# Patient Record
Sex: MALE | Race: WHITE | NOT HISPANIC OR LATINO | Employment: FULL TIME | URBAN - METROPOLITAN AREA
[De-identification: names, ages, dates, MRNs, and addresses within clinical notes are randomized per-mention and may not be internally consistent; named-entity substitution may affect disease eponyms.]

---

## 2019-01-14 ENCOUNTER — HOSPITAL ENCOUNTER (OUTPATIENT)
Dept: OTHER | Facility: HOSPITAL | Age: 29
Setting detail: SPECIMEN
Discharge: HOME | End: 2019-01-14
Attending: FAMILY MEDICINE | Admitting: FAMILY MEDICINE

## 2019-01-14 LAB
ALBUMIN SERPL-MCNC: 4.7 G/DL (ref 3.5–4.8)
ALBUMIN/GLOB SERPL: 2 {RATIO} (ref 1–1.7)
ALP SERPL-CCNC: 46 IU/L (ref 32–91)
ALT SERPL-CCNC: 30 IU/L (ref 17–63)
ANION GAP SERPL CALC-SCNC: 14.7 MMOL/L (ref 10–20)
AST SERPL-CCNC: 27 IU/L (ref 15–41)
BASOPHILS # BLD AUTO: 0 10*3/UL (ref 0–0.2)
BASOPHILS NFR BLD AUTO: 0 % (ref 0–2)
BILIRUB SERPL-MCNC: 0.7 MG/DL (ref 0.3–1.2)
BUN SERPL-MCNC: 17 MG/DL (ref 8–20)
BUN/CREAT SERPL: 18.9 (ref 6.2–20.3)
CALCIUM SERPL-MCNC: 9.2 MG/DL (ref 8.9–10.3)
CHLORIDE SERPL-SCNC: 104 MMOL/L (ref 101–111)
CHOLEST SERPL-MCNC: 188 MG/DL
CHOLEST/HDLC SERPL: 3.3 {RATIO}
CONV CO2: 25 MMOL/L (ref 22–32)
CONV LDL CHOLESTEROL DIRECT: 139 MG/DL (ref 0–100)
CONV TOTAL PROTEIN: 7.1 G/DL (ref 6.1–7.9)
CREAT UR-MCNC: 0.9 MG/DL (ref 0.7–1.2)
DIFFERENTIAL METHOD BLD: (no result)
EOSINOPHIL # BLD AUTO: 0.1 10*3/UL (ref 0–0.3)
EOSINOPHIL # BLD AUTO: 1 % (ref 0–3)
ERYTHROCYTE [DISTWIDTH] IN BLOOD BY AUTOMATED COUNT: 12.8 % (ref 11.5–14.5)
GLOBULIN UR ELPH-MCNC: 2.4 G/DL (ref 2.5–3.8)
GLUCOSE SERPL-MCNC: 87 MG/DL (ref 65–99)
HCT VFR BLD AUTO: 45.8 % (ref 40–54)
HDLC SERPL-MCNC: 57 MG/DL
HGB BLD-MCNC: 15.6 G/DL (ref 14–18)
LDLC/HDLC SERPL: 2.4 {RATIO}
LIPID INTERPRETATION: ABNORMAL
LYMPHOCYTES # BLD AUTO: 2 10*3/UL (ref 0.8–4.8)
LYMPHOCYTES NFR BLD AUTO: 40 % (ref 18–42)
MCH RBC QN AUTO: 29.2 PG (ref 26–32)
MCHC RBC AUTO-ENTMCNC: 34.1 G/DL (ref 32–36)
MCV RBC AUTO: 85.5 FL (ref 80–94)
MONOCYTES # BLD AUTO: 0.4 10*3/UL (ref 0.1–1.3)
MONOCYTES NFR BLD AUTO: 8 % (ref 2–11)
NEUTROPHILS # BLD AUTO: 2.5 10*3/UL (ref 2.3–8.6)
NEUTROPHILS NFR BLD AUTO: 51 % (ref 50–75)
NRBC BLD AUTO-RTO: 0 /100{WBCS}
NRBC/RBC NFR BLD MANUAL: 0 10*3/UL
PLATELET # BLD AUTO: 208 10*3/UL (ref 150–450)
PMV BLD AUTO: 9.1 FL (ref 7.4–10.4)
POTASSIUM SERPL-SCNC: 4.7 MMOL/L (ref 3.6–5.1)
RBC # BLD AUTO: 5.35 10*6/UL (ref 4.6–6)
SODIUM SERPL-SCNC: 139 MMOL/L (ref 136–144)
TRIGL SERPL-MCNC: 43 MG/DL
WBC # BLD AUTO: 4.9 10*3/UL (ref 4.5–11.5)

## 2019-09-12 RX ORDER — BISOPROLOL FUMARATE 5 MG/1
5 TABLET, FILM COATED ORAL DAILY
Qty: 30 TABLET | Refills: 0 | Status: SHIPPED | OUTPATIENT
Start: 2019-09-12 | End: 2019-10-10 | Stop reason: SDUPTHER

## 2019-09-12 RX ORDER — BISOPROLOL FUMARATE 5 MG/1
TABLET, FILM COATED ORAL EVERY 24 HOURS
COMMUNITY
Start: 2018-09-18 | End: 2019-09-12 | Stop reason: SDUPTHER

## 2019-10-10 RX ORDER — BISOPROLOL FUMARATE 5 MG/1
5 TABLET, FILM COATED ORAL DAILY
Qty: 30 TABLET | Refills: 0 | Status: SHIPPED | OUTPATIENT
Start: 2019-10-10 | End: 2019-11-13 | Stop reason: SDUPTHER

## 2019-11-13 NOTE — TELEPHONE ENCOUNTER
No show for appt with Taylor on 10/16/19.  Last ov and labs was with Dr. Hays on 1/14/19.  No future ov with a PCP

## 2019-11-15 RX ORDER — BISOPROLOL FUMARATE 5 MG/1
5 TABLET, FILM COATED ORAL DAILY
Qty: 30 TABLET | Refills: 0 | Status: SHIPPED | OUTPATIENT
Start: 2019-11-15 | End: 2019-12-26

## 2019-11-19 NOTE — TELEPHONE ENCOUNTER
Patient did not answer phone and does not have  set up.  I prepared a letter to be sent with information.

## 2019-12-26 RX ORDER — BISOPROLOL FUMARATE 5 MG/1
5 TABLET, FILM COATED ORAL DAILY
Qty: 30 TABLET | Refills: 0 | Status: SHIPPED | OUTPATIENT
Start: 2019-12-26 | End: 2020-02-05

## 2020-02-05 RX ORDER — BISOPROLOL FUMARATE 5 MG/1
5 TABLET, FILM COATED ORAL DAILY
Qty: 30 TABLET | Refills: 0 | Status: SHIPPED | OUTPATIENT
Start: 2020-02-05 | End: 2020-03-09

## 2020-03-09 RX ORDER — BISOPROLOL FUMARATE 5 MG/1
5 TABLET, FILM COATED ORAL DAILY
Qty: 30 TABLET | Refills: 0 | Status: SHIPPED | OUTPATIENT
Start: 2020-03-09 | End: 2020-04-14 | Stop reason: SDUPTHER

## 2020-04-14 ENCOUNTER — TELEMEDICINE (OUTPATIENT)
Dept: FAMILY MEDICINE CLINIC | Facility: CLINIC | Age: 30
End: 2020-04-14

## 2020-04-14 VITALS — WEIGHT: 165 LBS | BODY MASS INDEX: 24.44 KG/M2 | HEIGHT: 69 IN

## 2020-04-14 DIAGNOSIS — I10 ESSENTIAL HYPERTENSION: Primary | ICD-10-CM

## 2020-04-14 PROCEDURE — 99213 OFFICE O/P EST LOW 20 MIN: CPT | Performed by: NURSE PRACTITIONER

## 2020-04-14 RX ORDER — BISOPROLOL FUMARATE 5 MG/1
5 TABLET, FILM COATED ORAL DAILY
Qty: 30 TABLET | Refills: 2 | Status: SHIPPED | OUTPATIENT
Start: 2020-04-14 | End: 2020-04-23

## 2020-04-14 NOTE — PROGRESS NOTES
Chief Complaint   Patient presents with   • Establish Care     You have chosen to receive care through a telephone visit. Do you consent to use a telephone visit for your medical care today? Yes      HPI     Patient presents with via video visit to establish care and to follow-up on HTN, needs medication refill.       HTN, stable on meds and takes as directed, denies chest pain, headache, shortness of air, palpitations and swelling of extremities. Last labs stable Jan 2019.       Past Medical History:   Diagnosis Date   • Allergic rhinitis    • Conjunctivitis, acute    • HBP (high blood pressure)    • Hypertension    • Rhus dermatitis      No past surgical history on file.  Family History   Problem Relation Age of Onset   • Hypertension Father    • Hypertension Brother    • Cancer Other      Social History     Tobacco Use   • Smoking status: Former Smoker   Substance Use Topics   • Alcohol use: Not on file         Current Outpatient Medications:   •  bisoprolol (ZEBeta) 5 MG tablet, Take 1 tablet by mouth Daily., Disp: 30 tablet, Rfl: 2        The following portions of the patient's history were reviewed and updated as appropriate: allergies, current medications, past family history, past medical history, past social history, past surgical history and problem list.    Review of Systems   Constitutional: Negative for chills, fatigue and fever.   HENT: Negative for dental problem, ear pain, sinus pressure and sore throat.    Eyes: Negative for visual disturbance.   Respiratory: Negative for cough, shortness of breath and wheezing.    Cardiovascular: Negative for chest pain, palpitations and leg swelling.   Gastrointestinal: Negative for abdominal pain, blood in stool, constipation, diarrhea, nausea, vomiting and GERD.   Genitourinary: Negative for difficulty urinating, frequency, urgency and urinary incontinence.   Musculoskeletal: Negative for arthralgias, back pain, gait problem, joint swelling, myalgias and neck  "pain.   Skin: Negative for dry skin, pallor and rash.   Neurological: Negative for dizziness, seizures, speech difficulty and weakness.   Hematological: Negative for adenopathy.   Psychiatric/Behavioral: Negative for sleep disturbance, depressed mood and stress. The patient is not nervous/anxious.         Vitals:    04/14/20 0959   Weight: 74.8 kg (165 lb)   Height: 175.3 cm (69\")     Body mass index is 24.37 kg/m².  Physical Exam   Constitutional: He is oriented to person, place, and time. No distress.   Exam otherwise deferred through video visit   Pulmonary/Chest: Effort normal.   Neurological: He is alert and oriented to person, place, and time.   Psychiatric: He has a normal mood and affect. His behavior is normal. Judgment and thought content normal.     No visits with results within 7 Day(s) from this visit.   Latest known visit with results is:   No results found for any previous visit.       Diagnoses and all orders for this visit:    1. Essential hypertension (Primary)  -     Lipid Panel; Future  -     Comprehensive Metabolic Panel; Future  -     CBC (No Diff); Future  -     TSH; Future    Other orders  -     bisoprolol (ZEBeta) 5 MG tablet; Take 1 tablet by mouth Daily.  Dispense: 30 tablet; Refill: 2    -BP reportedly stable, refill bisoprolol, continue daily  -Return to office in 3 months with fasting labs 1 week prior.  We will discuss at follow-up visit   -call or return to office earlier if needed    This was an audio and video enabled telemedicine encounter.      "

## 2020-04-23 RX ORDER — BISOPROLOL FUMARATE 5 MG/1
5 TABLET, FILM COATED ORAL DAILY
Qty: 30 TABLET | Refills: 2 | Status: SHIPPED | OUTPATIENT
Start: 2020-04-23 | End: 2020-07-14 | Stop reason: SDUPTHER

## 2020-07-06 ENCOUNTER — CLINICAL SUPPORT (OUTPATIENT)
Dept: FAMILY MEDICINE CLINIC | Facility: CLINIC | Age: 30
End: 2020-07-06

## 2020-07-06 DIAGNOSIS — I10 ESSENTIAL HYPERTENSION: ICD-10-CM

## 2020-07-06 PROCEDURE — 80061 LIPID PANEL: CPT | Performed by: NURSE PRACTITIONER

## 2020-07-06 PROCEDURE — 85027 COMPLETE CBC AUTOMATED: CPT | Performed by: NURSE PRACTITIONER

## 2020-07-06 PROCEDURE — 84443 ASSAY THYROID STIM HORMONE: CPT | Performed by: NURSE PRACTITIONER

## 2020-07-06 PROCEDURE — 80053 COMPREHEN METABOLIC PANEL: CPT | Performed by: NURSE PRACTITIONER

## 2020-07-07 LAB
ALBUMIN SERPL-MCNC: 5 G/DL (ref 3.5–5.2)
ALBUMIN/GLOB SERPL: 2.3 G/DL
ALP SERPL-CCNC: 45 U/L (ref 39–117)
ALT SERPL W P-5'-P-CCNC: 25 U/L (ref 1–41)
ANION GAP SERPL CALCULATED.3IONS-SCNC: 11.4 MMOL/L (ref 5–15)
AST SERPL-CCNC: 23 U/L (ref 1–40)
BILIRUB SERPL-MCNC: 0.6 MG/DL (ref 0–1.2)
BUN SERPL-MCNC: 20 MG/DL (ref 6–20)
BUN/CREAT SERPL: 22 (ref 7–25)
CALCIUM SPEC-SCNC: 9.5 MG/DL (ref 8.6–10.5)
CHLORIDE SERPL-SCNC: 102 MMOL/L (ref 98–107)
CHOLEST SERPL-MCNC: 208 MG/DL (ref 0–200)
CO2 SERPL-SCNC: 25.6 MMOL/L (ref 22–29)
CREAT SERPL-MCNC: 0.91 MG/DL (ref 0.76–1.27)
DEPRECATED RDW RBC AUTO: 41.3 FL (ref 37–54)
ERYTHROCYTE [DISTWIDTH] IN BLOOD BY AUTOMATED COUNT: 12.8 % (ref 12.3–15.4)
GFR SERPL CREATININE-BSD FRML MDRD: 97 ML/MIN/1.73
GLOBULIN UR ELPH-MCNC: 2.2 GM/DL
GLUCOSE SERPL-MCNC: 90 MG/DL (ref 65–99)
HCT VFR BLD AUTO: 45.6 % (ref 37.5–51)
HDLC SERPL-MCNC: 54 MG/DL (ref 40–60)
HGB BLD-MCNC: 15.4 G/DL (ref 13–17.7)
LDLC SERPL CALC-MCNC: 138 MG/DL (ref 0–100)
LDLC/HDLC SERPL: 2.56 {RATIO}
MCH RBC QN AUTO: 29.5 PG (ref 26.6–33)
MCHC RBC AUTO-ENTMCNC: 33.8 G/DL (ref 31.5–35.7)
MCV RBC AUTO: 87.4 FL (ref 79–97)
PLATELET # BLD AUTO: 223 10*3/MM3 (ref 140–450)
PMV BLD AUTO: 11.6 FL (ref 6–12)
POTASSIUM SERPL-SCNC: 4.1 MMOL/L (ref 3.5–5.2)
PROT SERPL-MCNC: 7.2 G/DL (ref 6–8.5)
RBC # BLD AUTO: 5.22 10*6/MM3 (ref 4.14–5.8)
SODIUM SERPL-SCNC: 139 MMOL/L (ref 136–145)
TRIGL SERPL-MCNC: 80 MG/DL (ref 0–150)
TSH SERPL DL<=0.05 MIU/L-ACNC: 1.28 UIU/ML (ref 0.27–4.2)
VLDLC SERPL-MCNC: 16 MG/DL (ref 5–40)
WBC # BLD AUTO: 5.11 10*3/MM3 (ref 3.4–10.8)

## 2020-07-14 ENCOUNTER — OFFICE VISIT (OUTPATIENT)
Dept: FAMILY MEDICINE CLINIC | Facility: CLINIC | Age: 30
End: 2020-07-14

## 2020-07-14 VITALS
WEIGHT: 175.4 LBS | BODY MASS INDEX: 25.98 KG/M2 | SYSTOLIC BLOOD PRESSURE: 129 MMHG | TEMPERATURE: 97.5 F | OXYGEN SATURATION: 97 % | HEART RATE: 78 BPM | DIASTOLIC BLOOD PRESSURE: 78 MMHG | HEIGHT: 69 IN

## 2020-07-14 DIAGNOSIS — F41.9 ANXIETY: ICD-10-CM

## 2020-07-14 DIAGNOSIS — E78.2 MIXED HYPERLIPIDEMIA: ICD-10-CM

## 2020-07-14 DIAGNOSIS — I10 ESSENTIAL HYPERTENSION: Primary | ICD-10-CM

## 2020-07-14 DIAGNOSIS — G47.09 OTHER INSOMNIA: ICD-10-CM

## 2020-07-14 PROCEDURE — 99214 OFFICE O/P EST MOD 30 MIN: CPT | Performed by: NURSE PRACTITIONER

## 2020-07-14 RX ORDER — BISOPROLOL FUMARATE 5 MG/1
5 TABLET, FILM COATED ORAL DAILY
Qty: 30 TABLET | Refills: 5 | Status: SHIPPED | OUTPATIENT
Start: 2020-07-14 | End: 2020-11-09

## 2020-07-14 RX ORDER — HYDROXYZINE HYDROCHLORIDE 10 MG/1
10 TABLET, FILM COATED ORAL EVERY 8 HOURS PRN
Qty: 30 TABLET | Refills: 0 | Status: SHIPPED | OUTPATIENT
Start: 2020-07-14 | End: 2020-10-01 | Stop reason: SDUPTHER

## 2020-07-14 NOTE — PROGRESS NOTES
"Chief Complaint   Patient presents with   • Hypertension   • Follow-up   • Results     blood test results       HPI     HTN, stable on meds and takes as directed, denies chest pain, headache, shortness of air, palpitations and swelling of extremities.     Hyperlipidemia, The patient denies muscle aches, constipation, diarrhea, GI upset, fatigue, chest pain/pressure, exercise intolerance, dyspnea, palpitations, syncope and pedal edema.      Anxiety, patient denies significant weight loss/gain, +insomnia, denies hypersomnia, psychomotor agitation, psychomotor retardation, fatigue (loss of energy), feelings of worthlessness (guilt), impaired concentration (indecisiveness), thoughts of death or suicide.       The following portions of the patient's history were reviewed and updated as appropriate: allergies, current medications, past family history, past medical history, past social history, past surgical history and problem list.    Past Medical History:   Diagnosis Date   • Allergic rhinitis    • Conjunctivitis, acute    • HBP (high blood pressure)    • Hypertension    • Rhus dermatitis      No past surgical history on file.  Family History   Problem Relation Age of Onset   • Hypertension Father    • Hypertension Brother    • Cancer Other      Social History     Tobacco Use   • Smoking status: Former Smoker   Substance Use Topics   • Alcohol use: Not on file         Current Outpatient Medications:   •  bisoprolol (ZEBeta) 5 MG tablet, TAKE 1 TABLET BY MOUTH DAILY, Disp: 30 tablet, Rfl: 2      Review of Systems       A full 12 point review of systems has been obtained as mentioned in HPI, otherwise negative      Vitals:    07/14/20 1530   BP: 129/78   BP Location: Right arm   Patient Position: Sitting   Cuff Size: Adult   Pulse: 78   Temp: 97.5 °F (36.4 °C)   TempSrc: Skin   SpO2: 97%   Weight: 79.6 kg (175 lb 6.4 oz)   Height: 175.3 cm (69.02\")     Body mass index is 25.89 kg/m².    Physical Exam   Constitutional: He " is oriented to person, place, and time. He appears well-developed and well-nourished. No distress.   HENT:   Head: Normocephalic and atraumatic.   Eyes: Pupils are equal, round, and reactive to light.   Neck: Normal range of motion. No thyromegaly present.   Cardiovascular: Normal rate, regular rhythm, normal heart sounds and intact distal pulses.   No murmur heard.  Pulmonary/Chest: Effort normal and breath sounds normal. No respiratory distress.   Abdominal: Soft. Bowel sounds are normal. He exhibits no distension. There is no tenderness.   Musculoskeletal: Normal range of motion.   Neurological: He is alert and oriented to person, place, and time.   Skin: Skin is warm and dry. He is not diaphoretic. No erythema.   Psychiatric: He has a normal mood and affect. His behavior is normal. Judgment and thought content normal.   Nursing note and vitals reviewed.      No visits with results within 7 Day(s) from this visit.   Latest known visit with results is:   Clinical Support on 07/06/2020   Component Date Value Ref Range Status   • TSH 07/06/2020 1.280  0.270 - 4.200 uIU/mL Final   • WBC 07/06/2020 5.11  3.40 - 10.80 10*3/mm3 Final   • RBC 07/06/2020 5.22  4.14 - 5.80 10*6/mm3 Final   • Hemoglobin 07/06/2020 15.4  13.0 - 17.7 g/dL Final   • Hematocrit 07/06/2020 45.6  37.5 - 51.0 % Final   • MCV 07/06/2020 87.4  79.0 - 97.0 fL Final   • MCH 07/06/2020 29.5  26.6 - 33.0 pg Final   • MCHC 07/06/2020 33.8  31.5 - 35.7 g/dL Final   • RDW 07/06/2020 12.8  12.3 - 15.4 % Final   • RDW-SD 07/06/2020 41.3  37.0 - 54.0 fl Final   • MPV 07/06/2020 11.6  6.0 - 12.0 fL Final   • Platelets 07/06/2020 223  140 - 450 10*3/mm3 Final   • Glucose 07/06/2020 90  65 - 99 mg/dL Final   • BUN 07/06/2020 20  6 - 20 mg/dL Final   • Creatinine 07/06/2020 0.91  0.76 - 1.27 mg/dL Final   • Sodium 07/06/2020 139  136 - 145 mmol/L Final   • Potassium 07/06/2020 4.1  3.5 - 5.2 mmol/L Final   • Chloride 07/06/2020 102  98 - 107 mmol/L Final   • CO2  07/06/2020 25.6  22.0 - 29.0 mmol/L Final   • Calcium 07/06/2020 9.5  8.6 - 10.5 mg/dL Final   • Total Protein 07/06/2020 7.2  6.0 - 8.5 g/dL Final   • Albumin 07/06/2020 5.00  3.50 - 5.20 g/dL Final   • ALT (SGPT) 07/06/2020 25  1 - 41 U/L Final   • AST (SGOT) 07/06/2020 23  1 - 40 U/L Final   • Alkaline Phosphatase 07/06/2020 45  39 - 117 U/L Final   • Total Bilirubin 07/06/2020 0.6  0.0 - 1.2 mg/dL Final   • eGFR Non African Amer 07/06/2020 97  >60 mL/min/1.73 Final   • Globulin 07/06/2020 2.2  gm/dL Final   • A/G Ratio 07/06/2020 2.3  g/dL Final   • BUN/Creatinine Ratio 07/06/2020 22.0  7.0 - 25.0 Final   • Anion Gap 07/06/2020 11.4  5.0 - 15.0 mmol/L Final   • Total Cholesterol 07/06/2020 208* 0 - 200 mg/dL Final   • Triglycerides 07/06/2020 80  0 - 150 mg/dL Final   • HDL Cholesterol 07/06/2020 54  40 - 60 mg/dL Final   • LDL Cholesterol  07/06/2020 138* 0 - 100 mg/dL Final   • VLDL Cholesterol 07/06/2020 16  5 - 40 mg/dL Final   • LDL/HDL Ratio 07/06/2020 2.56   Final       Diagnoses and all orders for this visit:    1. Essential hypertension (Primary)  bp stable, rf bisoprolol    2. Mixed hyperlipidemia  Reviewed labs, discussed healthy heart diet, limiting fatty, fried, greasy foods and increasing exercise habits    3. Anxiety  Trial hydroxyzine prn, consideer valerium root    4. Other insomnia  Likely, anxiety driven.     rtc 6mo or earlier prn

## 2020-10-01 ENCOUNTER — OFFICE VISIT (OUTPATIENT)
Dept: FAMILY MEDICINE CLINIC | Facility: CLINIC | Age: 30
End: 2020-10-01

## 2020-10-01 VITALS
HEIGHT: 69 IN | SYSTOLIC BLOOD PRESSURE: 139 MMHG | DIASTOLIC BLOOD PRESSURE: 101 MMHG | TEMPERATURE: 97.1 F | WEIGHT: 183.8 LBS | OXYGEN SATURATION: 98 % | BODY MASS INDEX: 27.22 KG/M2 | HEART RATE: 67 BPM

## 2020-10-01 DIAGNOSIS — F41.9 ANXIETY: Primary | ICD-10-CM

## 2020-10-01 DIAGNOSIS — Z23 INFLUENZA VACCINE ADMINISTERED: ICD-10-CM

## 2020-10-01 PROCEDURE — 90686 IIV4 VACC NO PRSV 0.5 ML IM: CPT | Performed by: NURSE PRACTITIONER

## 2020-10-01 PROCEDURE — 90471 IMMUNIZATION ADMIN: CPT | Performed by: NURSE PRACTITIONER

## 2020-10-01 PROCEDURE — 99213 OFFICE O/P EST LOW 20 MIN: CPT | Performed by: NURSE PRACTITIONER

## 2020-10-01 RX ORDER — HYDROXYZINE HYDROCHLORIDE 10 MG/1
TABLET, FILM COATED ORAL
Qty: 60 TABLET | Refills: 0 | Status: SHIPPED | OUTPATIENT
Start: 2020-10-01 | End: 2021-04-05

## 2020-10-01 NOTE — PROGRESS NOTES
Chief Complaint   Patient presents with   • Anxiety     wants to discuss medication.  pt reports that some days hydroxyzine helped, but now it doesn't always helps.  wants to discuss other options.   • Follow-up     would like flu vax and says it has probably been 10 yrs since last tdap       HPI     Anxiety, chronic, intermittent, happens more socially-crowded areas/restaurants, isolating and would rather not go out, is more introverted now, hydrox helps some but , patient denies significant weight loss/gain, insomnia, hypersomnia, psychomotor agitation, psychomotor retardation, fatigue (loss of energy), feelings of worthlessness (guilt), impaired concentration (indecisiveness), thoughts of death or suicide.         The following portions of the patient's history were reviewed and updated as appropriate: allergies, current medications, past family history, past medical history, past social history, past surgical history and problem list.    Past Medical History:   Diagnosis Date   • Allergic rhinitis    • Conjunctivitis, acute    • HBP (high blood pressure)    • Hypertension    • Rhus dermatitis      History reviewed. No pertinent surgical history.  Family History   Problem Relation Age of Onset   • Hypertension Father    • Hypertension Brother    • Cancer Other      Social History     Tobacco Use   • Smoking status: Former Smoker   Substance Use Topics   • Alcohol use: Not on file         Current Outpatient Medications:   •  bisoprolol (ZEBeta) 5 MG tablet, Take 1 tablet by mouth Daily., Disp: 30 tablet, Rfl: 5  •  hydrOXYzine (ATARAX) 10 MG tablet, Take 1 or 2 po every 8 hours prn for anxiety, Disp: 60 tablet, Rfl: 0      Review of Systems       Obtained as mentioned in HPI, otherwise negative.       Vitals:    10/01/20 1611   BP: (!) 139/101   BP Location: Left arm   Patient Position: Sitting   Cuff Size: Adult   Pulse: 67   Temp: 97.1 °F (36.2 °C)   TempSrc: Skin   SpO2: 98%   Weight: 83.4 kg (183 lb 12.8 oz)  "  Height: 175.3 cm (69.02\")     Body mass index is 27.13 kg/m².    Physical Exam  Constitutional:       General: He is not in acute distress.     Appearance: He is well-developed. He is not diaphoretic.   HENT:      Head: Normocephalic.   Eyes:      Conjunctiva/sclera: Conjunctivae normal.      Pupils: Pupils are equal, round, and reactive to light.   Neck:      Musculoskeletal: Normal range of motion and neck supple.      Thyroid: No thyromegaly.      Vascular: No JVD.   Cardiovascular:      Rate and Rhythm: Normal rate and regular rhythm.      Heart sounds: Normal heart sounds. No murmur.   Pulmonary:      Effort: Pulmonary effort is normal.      Breath sounds: Normal breath sounds.   Abdominal:      General: Bowel sounds are normal. There is no distension.      Palpations: Abdomen is soft.      Tenderness: There is no abdominal tenderness.   Musculoskeletal: Normal range of motion.         General: No tenderness.   Skin:     General: Skin is warm and dry.      Findings: No erythema or rash.   Neurological:      Mental Status: He is alert and oriented to person, place, and time.      Sensory: No sensory deficit.   Psychiatric:         Attention and Perception: Attention normal.         Mood and Affect: Mood is anxious. Mood is not depressed.         Speech: Speech normal.         Behavior: Behavior normal.         Thought Content: Thought content normal.         Cognition and Memory: Cognition normal.         Judgment: Judgment normal.         No visits with results within 7 Day(s) from this visit.   Latest known visit with results is:   Clinical Support on 07/06/2020   Component Date Value Ref Range Status   • TSH 07/06/2020 1.280  0.270 - 4.200 uIU/mL Final   • WBC 07/06/2020 5.11  3.40 - 10.80 10*3/mm3 Final   • RBC 07/06/2020 5.22  4.14 - 5.80 10*6/mm3 Final   • Hemoglobin 07/06/2020 15.4  13.0 - 17.7 g/dL Final   • Hematocrit 07/06/2020 45.6  37.5 - 51.0 % Final   • MCV 07/06/2020 87.4  79.0 - 97.0 fL Final "   • MCH 07/06/2020 29.5  26.6 - 33.0 pg Final   • MCHC 07/06/2020 33.8  31.5 - 35.7 g/dL Final   • RDW 07/06/2020 12.8  12.3 - 15.4 % Final   • RDW-SD 07/06/2020 41.3  37.0 - 54.0 fl Final   • MPV 07/06/2020 11.6  6.0 - 12.0 fL Final   • Platelets 07/06/2020 223  140 - 450 10*3/mm3 Final   • Glucose 07/06/2020 90  65 - 99 mg/dL Final   • BUN 07/06/2020 20  6 - 20 mg/dL Final   • Creatinine 07/06/2020 0.91  0.76 - 1.27 mg/dL Final   • Sodium 07/06/2020 139  136 - 145 mmol/L Final   • Potassium 07/06/2020 4.1  3.5 - 5.2 mmol/L Final   • Chloride 07/06/2020 102  98 - 107 mmol/L Final   • CO2 07/06/2020 25.6  22.0 - 29.0 mmol/L Final   • Calcium 07/06/2020 9.5  8.6 - 10.5 mg/dL Final   • Total Protein 07/06/2020 7.2  6.0 - 8.5 g/dL Final   • Albumin 07/06/2020 5.00  3.50 - 5.20 g/dL Final   • ALT (SGPT) 07/06/2020 25  1 - 41 U/L Final   • AST (SGOT) 07/06/2020 23  1 - 40 U/L Final   • Alkaline Phosphatase 07/06/2020 45  39 - 117 U/L Final   • Total Bilirubin 07/06/2020 0.6  0.0 - 1.2 mg/dL Final   • eGFR Non African Amer 07/06/2020 97  >60 mL/min/1.73 Final   • Globulin 07/06/2020 2.2  gm/dL Final   • A/G Ratio 07/06/2020 2.3  g/dL Final   • BUN/Creatinine Ratio 07/06/2020 22.0  7.0 - 25.0 Final   • Anion Gap 07/06/2020 11.4  5.0 - 15.0 mmol/L Final   • Total Cholesterol 07/06/2020 208* 0 - 200 mg/dL Final   • Triglycerides 07/06/2020 80  0 - 150 mg/dL Final   • HDL Cholesterol 07/06/2020 54  40 - 60 mg/dL Final   • LDL Cholesterol  07/06/2020 138* 0 - 100 mg/dL Final   • VLDL Cholesterol 07/06/2020 16  5 - 40 mg/dL Final   • LDL/HDL Ratio 07/06/2020 2.56   Final       Diagnoses and all orders for this visit:    1. Anxiety (Primary)  Comments:  rec inc hydroxyzine to 2 tabs prn prior to events, social situations.     2. Influenza vaccine administered    Other orders  -     hydrOXYzine (ATARAX) 10 MG tablet; Take 1 or 2 po every 8 hours prn for anxiety  Dispense: 60 tablet; Refill: 0  -     Fluarix/Fluzone/Afluria Quad>6  Months      Return in about 6 months (around 4/1/2021) for anxiety.

## 2020-10-23 ENCOUNTER — CLINICAL SUPPORT (OUTPATIENT)
Dept: FAMILY MEDICINE CLINIC | Facility: CLINIC | Age: 30
End: 2020-10-23

## 2020-10-23 ENCOUNTER — TELEPHONE (OUTPATIENT)
Dept: FAMILY MEDICINE CLINIC | Facility: CLINIC | Age: 30
End: 2020-10-23

## 2020-10-23 DIAGNOSIS — Z23 NEED FOR TDAP VACCINATION: Primary | ICD-10-CM

## 2020-10-23 PROCEDURE — 90471 IMMUNIZATION ADMIN: CPT | Performed by: NURSE PRACTITIONER

## 2020-10-23 PROCEDURE — 90715 TDAP VACCINE 7 YRS/> IM: CPT | Performed by: NURSE PRACTITIONER

## 2020-10-23 NOTE — TELEPHONE ENCOUNTER
No, it would be safer to update it. Typically, Tdap must be UTD within 5 years or less if there is an injury. So if he is unsure, I would update it.

## 2020-10-23 NOTE — TELEPHONE ENCOUNTER
"Pt reports that he cut himself on something \"not so clean\" and he asked if he could walk in for a TDAP.  He said the cut looks fine and is healing fine.  He reports that he might have had a TDAP 5 years ago when his child was born, but he has no record of it so he is leaning toward the idea that he didn't have it.  Even if he did have it 5 years ago, will having it again affect him?  Thank you.  "

## 2020-11-09 RX ORDER — BISOPROLOL FUMARATE 5 MG/1
5 TABLET, FILM COATED ORAL DAILY
Qty: 30 TABLET | Refills: 5 | Status: SHIPPED | OUTPATIENT
Start: 2020-11-09 | End: 2021-04-05 | Stop reason: SDUPTHER

## 2021-04-05 ENCOUNTER — OFFICE VISIT (OUTPATIENT)
Dept: FAMILY MEDICINE CLINIC | Facility: CLINIC | Age: 31
End: 2021-04-05

## 2021-04-05 VITALS
TEMPERATURE: 97.1 F | HEIGHT: 69 IN | DIASTOLIC BLOOD PRESSURE: 69 MMHG | OXYGEN SATURATION: 98 % | BODY MASS INDEX: 26.81 KG/M2 | HEART RATE: 70 BPM | WEIGHT: 181 LBS | SYSTOLIC BLOOD PRESSURE: 123 MMHG

## 2021-04-05 DIAGNOSIS — E78.2 MIXED HYPERLIPIDEMIA: ICD-10-CM

## 2021-04-05 DIAGNOSIS — Z00.00 PREVENTATIVE HEALTH CARE: ICD-10-CM

## 2021-04-05 DIAGNOSIS — I10 ESSENTIAL HYPERTENSION: Primary | ICD-10-CM

## 2021-04-05 DIAGNOSIS — F41.9 ANXIETY: ICD-10-CM

## 2021-04-05 PROCEDURE — 99213 OFFICE O/P EST LOW 20 MIN: CPT | Performed by: NURSE PRACTITIONER

## 2021-04-05 RX ORDER — BISOPROLOL FUMARATE 5 MG/1
5 TABLET, FILM COATED ORAL DAILY
Qty: 90 TABLET | Refills: 1 | Status: SHIPPED | OUTPATIENT
Start: 2021-04-05 | End: 2021-10-11 | Stop reason: SDUPTHER

## 2021-04-05 NOTE — PROGRESS NOTES
"Chief Complaint  Follow-up (6 month follow up)    Subjective          Brandon Briggs presents to [facility]  History of Present Illness     HTN, stable on meds and takes as directed, denies chest pain, headache, shortness of air, palpitations and swelling of extremities.     Anxiety, resolved, patient has used a few doses of hydroxyzine which is effective but psychosocial issues have improved and he is more open and discussing his anxiety with others which has also improved symptoms. Patient denies significant weight loss/gain, insomnia, hypersomnia, psychomotor agitation, psychomotor retardation, fatigue (loss of energy), feelings of worthlessness (guilt), impaired concentration (indecisiveness), thoughts of death or suicide.      Hyperlipidemia, slightly elevated in the past, not on statin, he denies constipation, diarrhea, GI upset, fatigue, chest pain/pressure, exercise intolerance, dyspnea, palpitations, syncope and pedal edema.         Objective   Vital Signs:   /69 (BP Location: Left arm, Patient Position: Sitting, Cuff Size: Adult)   Pulse 70   Temp 97.1 °F (36.2 °C) (Skin)   Ht 175.3 cm (69\")   Wt 82.1 kg (181 lb)   SpO2 98%   BMI 26.73 kg/m²       Physical Exam  Vitals and nursing note reviewed.   Constitutional:       General: He is not in acute distress.     Appearance: He is well-developed. He is not diaphoretic.   HENT:      Head: Normocephalic and atraumatic.   Eyes:      Pupils: Pupils are equal, round, and reactive to light.   Neck:      Thyroid: No thyromegaly.   Cardiovascular:      Rate and Rhythm: Normal rate and regular rhythm.      Heart sounds: Normal heart sounds. No murmur heard.     Pulmonary:      Effort: Pulmonary effort is normal. No respiratory distress.      Breath sounds: Normal breath sounds.   Abdominal:      General: Bowel sounds are normal. There is no distension.      Palpations: Abdomen is soft.      Tenderness: There is no abdominal " tenderness.   Musculoskeletal:         General: Normal range of motion.      Cervical back: Normal range of motion.   Skin:     General: Skin is warm and dry.      Findings: No erythema.   Neurological:      Mental Status: He is alert and oriented to person, place, and time.   Psychiatric:         Behavior: Behavior normal.         Thought Content: Thought content normal.         Judgment: Judgment normal.        Result Review :                 Assessment and Plan    Diagnoses and all orders for this visit:    1. Essential hypertension (Primary)  Comments:  BP stable, patient is tolerating bisoprolol well, continue/refill  Orders:  -     bisoprolol (ZEBeta) 5 MG tablet; Take 1 tablet by mouth Daily.  Dispense: 90 tablet; Refill: 1    2. Preventative health care  Comments:  annual physical next visit, will collect hypertension panel and hepatitis C screening prior to the visit    3. Anxiety  Comments:  Essentially resolved, recommend patient to continue discussing anxiety and okay to use hydroxyzine as needed. call if s/s recur or worsen    4. Mixed hyperlipidemia  Comments:  Reviewed previous labs, lipids slight elevation, will plan to repeat prior to next visit fasting and discuss at the follow-up visit.       I spent 20 minutes caring for Brandon on this date of service. This time includes time spent by me in the following activities:preparing for the visit, reviewing tests, performing a medically appropriate examination and/or evaluation , counseling and educating the patient/family/caregiver, ordering medications, tests, or procedures and documenting information in the medical record     Follow Up   Return in about 6 months (around 10/5/2021) for Annual physical and f/u on HTN. htn panel prior to visit. .  Patient was given instructions and counseling regarding his condition or for health maintenance advice. Please see specific information pulled into the AVS if appropriate.

## 2021-10-11 ENCOUNTER — OFFICE VISIT (OUTPATIENT)
Dept: FAMILY MEDICINE CLINIC | Facility: CLINIC | Age: 31
End: 2021-10-11

## 2021-10-11 VITALS
SYSTOLIC BLOOD PRESSURE: 128 MMHG | BODY MASS INDEX: 26.48 KG/M2 | HEART RATE: 64 BPM | TEMPERATURE: 97.3 F | WEIGHT: 178.8 LBS | HEIGHT: 69 IN | OXYGEN SATURATION: 98 % | DIASTOLIC BLOOD PRESSURE: 78 MMHG

## 2021-10-11 DIAGNOSIS — Z11.59 NEED FOR HEPATITIS C SCREENING TEST: ICD-10-CM

## 2021-10-11 DIAGNOSIS — I10 ESSENTIAL HYPERTENSION: Primary | ICD-10-CM

## 2021-10-11 DIAGNOSIS — Z23 NEED FOR INFLUENZA VACCINATION: ICD-10-CM

## 2021-10-11 DIAGNOSIS — E78.2 MIXED HYPERLIPIDEMIA: ICD-10-CM

## 2021-10-11 DIAGNOSIS — Z00.00 PREVENTATIVE HEALTH CARE: ICD-10-CM

## 2021-10-11 PROBLEM — J30.9 ALLERGIC RHINITIS: Status: ACTIVE | Noted: 2017-09-05

## 2021-10-11 PROBLEM — H10.30 ACUTE CONJUNCTIVITIS: Status: ACTIVE | Noted: 2017-09-05

## 2021-10-11 PROCEDURE — 90686 IIV4 VACC NO PRSV 0.5 ML IM: CPT | Performed by: NURSE PRACTITIONER

## 2021-10-11 PROCEDURE — 90471 IMMUNIZATION ADMIN: CPT | Performed by: NURSE PRACTITIONER

## 2021-10-11 PROCEDURE — 80061 LIPID PANEL: CPT | Performed by: NURSE PRACTITIONER

## 2021-10-11 PROCEDURE — 99395 PREV VISIT EST AGE 18-39: CPT | Performed by: NURSE PRACTITIONER

## 2021-10-11 PROCEDURE — 80053 COMPREHEN METABOLIC PANEL: CPT | Performed by: NURSE PRACTITIONER

## 2021-10-11 PROCEDURE — 36415 COLL VENOUS BLD VENIPUNCTURE: CPT | Performed by: NURSE PRACTITIONER

## 2021-10-11 PROCEDURE — 84443 ASSAY THYROID STIM HORMONE: CPT | Performed by: NURSE PRACTITIONER

## 2021-10-11 PROCEDURE — 85027 COMPLETE CBC AUTOMATED: CPT | Performed by: NURSE PRACTITIONER

## 2021-10-11 PROCEDURE — 86803 HEPATITIS C AB TEST: CPT | Performed by: NURSE PRACTITIONER

## 2021-10-11 RX ORDER — BISOPROLOL FUMARATE 5 MG/1
5 TABLET, FILM COATED ORAL DAILY
Qty: 90 TABLET | Refills: 1 | Status: SHIPPED | OUTPATIENT
Start: 2021-10-11 | End: 2022-06-13

## 2021-10-11 NOTE — PROGRESS NOTES
"Chief Complaint  Annual Exam    Subjective          Brandon Briggs presents to [facility] for   History of Present Illness       Patient presents today for annual exam.  He has been fully vaccinated for COVID-19 and is requesting a flu shot today.    HTN, stable on meds and takes as directed, denies chest pain, headache, shortness of air, palpitations and swelling of extremities.      Hyperlipidemia, mild in past, the patient denies constipation, diarrhea, GI upset, fatigue, chest pain/pressure, exercise intolerance, dyspnea, palpitations, syncope and pedal edema.        The following portions of the patient's history were reviewed and updated as appropriate: allergies, current medications, past family history, past medical history, past social history, past surgical history and problem list.    Past Medical History:   Diagnosis Date   • Allergic rhinitis    • Conjunctivitis, acute    • HBP (high blood pressure)    • Hypertension    • Rhus dermatitis      History reviewed. No pertinent surgical history.  Family History   Problem Relation Age of Onset   • Hypertension Father    • Hypertension Brother    • Cancer Other      Social History     Tobacco Use   • Smoking status: Never Smoker   • Smokeless tobacco: Never Used   Substance Use Topics   • Alcohol use: Not on file       Current Outpatient Medications:   •  bisoprolol (ZEBeta) 5 MG tablet, Take 1 tablet by mouth Daily., Disp: 90 tablet, Rfl: 1    Objective   Vital Signs:   /78 (BP Location: Left arm, Patient Position: Sitting, Cuff Size: Adult)   Pulse 64   Temp 97.3 °F (36.3 °C) (Infrared)   Ht 175.3 cm (69.02\")   Wt 81.1 kg (178 lb 12.8 oz)   SpO2 98%   BMI 26.39 kg/m²       Physical Exam  Vitals and nursing note reviewed.   Constitutional:       General: He is not in acute distress.     Appearance: He is well-developed. He is not diaphoretic.   HENT:      Head: Normocephalic and atraumatic.   Eyes:      Pupils: " Pupils are equal, round, and reactive to light.   Neck:      Thyroid: No thyromegaly.   Cardiovascular:      Rate and Rhythm: Normal rate and regular rhythm.      Heart sounds: Normal heart sounds. No murmur heard.      Pulmonary:      Effort: Pulmonary effort is normal. No respiratory distress.      Breath sounds: Normal breath sounds.   Abdominal:      General: Bowel sounds are normal. There is no distension.      Palpations: Abdomen is soft.      Tenderness: There is no abdominal tenderness.   Musculoskeletal:         General: Normal range of motion.      Cervical back: Normal range of motion.   Skin:     General: Skin is warm and dry.      Findings: No erythema.   Neurological:      Mental Status: He is alert and oriented to person, place, and time.   Psychiatric:         Behavior: Behavior normal.         Thought Content: Thought content normal.         Judgment: Judgment normal.          Result Review :     No visits with results within 7 Day(s) from this visit.   Latest known visit with results is:   Clinical Support on 07/06/2020   Component Date Value Ref Range Status   • TSH 07/06/2020 1.280  0.270 - 4.200 uIU/mL Final   • WBC 07/06/2020 5.11  3.40 - 10.80 10*3/mm3 Final   • RBC 07/06/2020 5.22  4.14 - 5.80 10*6/mm3 Final   • Hemoglobin 07/06/2020 15.4  13.0 - 17.7 g/dL Final   • Hematocrit 07/06/2020 45.6  37.5 - 51.0 % Final   • MCV 07/06/2020 87.4  79.0 - 97.0 fL Final   • MCH 07/06/2020 29.5  26.6 - 33.0 pg Final   • MCHC 07/06/2020 33.8  31.5 - 35.7 g/dL Final   • RDW 07/06/2020 12.8  12.3 - 15.4 % Final   • RDW-SD 07/06/2020 41.3  37.0 - 54.0 fl Final   • MPV 07/06/2020 11.6  6.0 - 12.0 fL Final   • Platelets 07/06/2020 223  140 - 450 10*3/mm3 Final   • Glucose 07/06/2020 90  65 - 99 mg/dL Final   • BUN 07/06/2020 20  6 - 20 mg/dL Final   • Creatinine 07/06/2020 0.91  0.76 - 1.27 mg/dL Final   • Sodium 07/06/2020 139  136 - 145 mmol/L Final   • Potassium 07/06/2020 4.1  3.5 - 5.2 mmol/L Final   •  Chloride 07/06/2020 102  98 - 107 mmol/L Final   • CO2 07/06/2020 25.6  22.0 - 29.0 mmol/L Final   • Calcium 07/06/2020 9.5  8.6 - 10.5 mg/dL Final   • Total Protein 07/06/2020 7.2  6.0 - 8.5 g/dL Final   • Albumin 07/06/2020 5.00  3.50 - 5.20 g/dL Final   • ALT (SGPT) 07/06/2020 25  1 - 41 U/L Final   • AST (SGOT) 07/06/2020 23  1 - 40 U/L Final   • Alkaline Phosphatase 07/06/2020 45  39 - 117 U/L Final   • Total Bilirubin 07/06/2020 0.6  0.0 - 1.2 mg/dL Final   • eGFR Non African Amer 07/06/2020 97  >60 mL/min/1.73 Final   • Globulin 07/06/2020 2.2  gm/dL Final   • A/G Ratio 07/06/2020 2.3  g/dL Final   • BUN/Creatinine Ratio 07/06/2020 22.0  7.0 - 25.0 Final   • Anion Gap 07/06/2020 11.4  5.0 - 15.0 mmol/L Final   • Total Cholesterol 07/06/2020 208* 0 - 200 mg/dL Final   • Triglycerides 07/06/2020 80  0 - 150 mg/dL Final   • HDL Cholesterol 07/06/2020 54  40 - 60 mg/dL Final   • LDL Cholesterol  07/06/2020 138* 0 - 100 mg/dL Final   • VLDL Cholesterol 07/06/2020 16  5 - 40 mg/dL Final   • LDL/HDL Ratio 07/06/2020 2.56   Final                       Assessment and Plan    Diagnoses and all orders for this visit:    1. Essential hypertension (Primary)  Comments:  BP stable, patient is tolerating bisoprolol well, continue/refill  Orders:  -     Comprehensive Metabolic Panel  -     CBC (No Diff)  -     TSH  -     bisoprolol (ZEBeta) 5 MG tablet; Take 1 tablet by mouth Daily.  Dispense: 90 tablet; Refill: 1    2. Preventative health care  Comments:  Overall doing well, labs and hep C screening today as ordered  Flu shot today  Return in 1 year or earlier if needed    3. Mixed hyperlipidemia  -     Lipid Panel    4. Need for hepatitis C screening test  -     Hepatitis C Antibody    5. Need for influenza vaccination  -     FluLaval/Fluarix (VFC) >6 Months      Age appropriate preventative counseling provided, including healthy lifestyle modifications and exercise        I spent 30 minutes caring for Brandon Briggs on  this date of service. This time includes time spent by me in the following activities: preparing for the visit, reviewing tests, performing a medically appropriate examination and/or evaluation , counseling and educating the patient/family/caregiver, ordering medications, tests, or procedures and documenting information in the medical record        Follow Up     Return in about 1 year (around 10/11/2022) for Annual physical and HTN.  Patient was given instructions and counseling regarding his condition or for health maintenance advice. Please see specific information pulled into the AVS if appropriate.      EMR Dragon transcription disclaimer:  Some of this encounter note is an electronic transcription translation of spoken language to printed text. The electronic translation of spoken language may permit erroneous, or at times, nonsensical words or phrases to be inadvertently transcribed; Although I have reviewed the note for such errors some may still exist.

## 2021-10-11 NOTE — PROGRESS NOTES
Venipuncture Blood Specimen Collection  Venipuncture performed in left arm by Layla Villeda MA with good hemostasis. Patient tolerated the procedure well without complications.   10/11/21   Layla Villeda MA  Injection  Injection performed in left deltoid by Layla Villeda MA. Patient tolerated the procedure well without complications.  10/11/21   Layla Villeda MA

## 2021-10-12 LAB
ALBUMIN SERPL-MCNC: 5.2 G/DL (ref 3.5–5.2)
ALBUMIN/GLOB SERPL: 2.3 G/DL
ALP SERPL-CCNC: 54 U/L (ref 39–117)
ALT SERPL W P-5'-P-CCNC: 24 U/L (ref 1–41)
ANION GAP SERPL CALCULATED.3IONS-SCNC: 11.2 MMOL/L (ref 5–15)
AST SERPL-CCNC: 23 U/L (ref 1–40)
BILIRUB SERPL-MCNC: 0.4 MG/DL (ref 0–1.2)
BUN SERPL-MCNC: 17 MG/DL (ref 6–20)
BUN/CREAT SERPL: 20 (ref 7–25)
CALCIUM SPEC-SCNC: 9.5 MG/DL (ref 8.6–10.5)
CHLORIDE SERPL-SCNC: 102 MMOL/L (ref 98–107)
CHOLEST SERPL-MCNC: 190 MG/DL (ref 0–200)
CO2 SERPL-SCNC: 26.8 MMOL/L (ref 22–29)
CREAT SERPL-MCNC: 0.85 MG/DL (ref 0.76–1.27)
DEPRECATED RDW RBC AUTO: 38.3 FL (ref 37–54)
ERYTHROCYTE [DISTWIDTH] IN BLOOD BY AUTOMATED COUNT: 12.4 % (ref 12.3–15.4)
GFR SERPL CREATININE-BSD FRML MDRD: 104 ML/MIN/1.73
GLOBULIN UR ELPH-MCNC: 2.3 GM/DL
GLUCOSE SERPL-MCNC: 85 MG/DL (ref 65–99)
HCT VFR BLD AUTO: 42.6 % (ref 37.5–51)
HCV AB SER DONR QL: NORMAL
HDLC SERPL-MCNC: 53 MG/DL (ref 40–60)
HGB BLD-MCNC: 14.5 G/DL (ref 13–17.7)
LDLC SERPL CALC-MCNC: 98 MG/DL (ref 0–100)
LDLC/HDLC SERPL: 1.72 {RATIO}
MCH RBC QN AUTO: 29.2 PG (ref 26.6–33)
MCHC RBC AUTO-ENTMCNC: 34 G/DL (ref 31.5–35.7)
MCV RBC AUTO: 85.9 FL (ref 79–97)
PLATELET # BLD AUTO: 214 10*3/MM3 (ref 140–450)
PMV BLD AUTO: 11.6 FL (ref 6–12)
POTASSIUM SERPL-SCNC: 4.4 MMOL/L (ref 3.5–5.2)
PROT SERPL-MCNC: 7.5 G/DL (ref 6–8.5)
RBC # BLD AUTO: 4.96 10*6/MM3 (ref 4.14–5.8)
SODIUM SERPL-SCNC: 140 MMOL/L (ref 136–145)
TRIGL SERPL-MCNC: 230 MG/DL (ref 0–150)
TSH SERPL DL<=0.05 MIU/L-ACNC: 0.91 UIU/ML (ref 0.27–4.2)
VLDLC SERPL-MCNC: 39 MG/DL (ref 5–40)
WBC # BLD AUTO: 5.73 10*3/MM3 (ref 3.4–10.8)

## 2022-06-12 DIAGNOSIS — I10 ESSENTIAL HYPERTENSION: ICD-10-CM

## 2022-06-13 RX ORDER — BISOPROLOL FUMARATE 5 MG/1
5 TABLET, FILM COATED ORAL DAILY
Qty: 90 TABLET | Refills: 1 | Status: SHIPPED | OUTPATIENT
Start: 2022-06-13 | End: 2022-11-01 | Stop reason: SDUPTHER

## 2022-06-13 NOTE — TELEPHONE ENCOUNTER
Rx Refill Note  Requested Prescriptions     Pending Prescriptions Disp Refills   • bisoprolol (ZEBeta) 5 MG tablet [Pharmacy Med Name: BISOPROLOL FUMARATE 5MG TABLETS] 90 tablet 1     Sig: TAKE 1 TABLET BY MOUTH DAILY      Last office visit with prescribing clinician: 10/11/2021      Next office visit with prescribing clinician: 10/12/2022            Layla Villeda MA  06/13/22, 09:20 EDT

## 2022-11-01 ENCOUNTER — OFFICE VISIT (OUTPATIENT)
Dept: FAMILY MEDICINE CLINIC | Facility: CLINIC | Age: 32
End: 2022-11-01

## 2022-11-01 VITALS
WEIGHT: 183.4 LBS | BODY MASS INDEX: 27.16 KG/M2 | HEIGHT: 69 IN | OXYGEN SATURATION: 97 % | HEART RATE: 62 BPM | DIASTOLIC BLOOD PRESSURE: 73 MMHG | SYSTOLIC BLOOD PRESSURE: 109 MMHG | TEMPERATURE: 98.4 F

## 2022-11-01 DIAGNOSIS — I10 ESSENTIAL HYPERTENSION: ICD-10-CM

## 2022-11-01 DIAGNOSIS — E78.2 MIXED HYPERLIPIDEMIA: ICD-10-CM

## 2022-11-01 DIAGNOSIS — Z00.00 PREVENTATIVE HEALTH CARE: Primary | ICD-10-CM

## 2022-11-01 PROCEDURE — 84443 ASSAY THYROID STIM HORMONE: CPT | Performed by: NURSE PRACTITIONER

## 2022-11-01 PROCEDURE — 36415 COLL VENOUS BLD VENIPUNCTURE: CPT | Performed by: NURSE PRACTITIONER

## 2022-11-01 PROCEDURE — 80053 COMPREHEN METABOLIC PANEL: CPT | Performed by: NURSE PRACTITIONER

## 2022-11-01 PROCEDURE — 99395 PREV VISIT EST AGE 18-39: CPT | Performed by: NURSE PRACTITIONER

## 2022-11-01 PROCEDURE — 85027 COMPLETE CBC AUTOMATED: CPT | Performed by: NURSE PRACTITIONER

## 2022-11-01 PROCEDURE — 80061 LIPID PANEL: CPT | Performed by: NURSE PRACTITIONER

## 2022-11-01 RX ORDER — BISOPROLOL FUMARATE 5 MG/1
5 TABLET, FILM COATED ORAL DAILY
Qty: 90 TABLET | Refills: 3 | Status: SHIPPED | OUTPATIENT
Start: 2022-11-01 | End: 2022-12-27

## 2022-11-01 NOTE — PROGRESS NOTES
Venipuncture Blood Specimen Collection  Venipuncture performed in the left arm by Dominique Zaragoza MA with good hemostasis. Patient tolerated the procedure well without complications.   11/01/22   Dominique Zaragoza MA

## 2022-11-01 NOTE — PROGRESS NOTES
"Chief Complaint  Chief Complaint   Patient presents with   • Annual Exam           Subjective          Brandon Briggs presents to [facility] for   History of Present Illness     Patient presents for annual exam.  Overall doing well without complaints    HTN, stable on meds and takes as directed, denies chest pain, headache, shortness of air, palpitations and swelling of extremities.     Hyperlipidemia, with mild hypertriglyceridemia 1 year ago, the patient is not on a statin, he eats fairly healthy and exercises.  The patient denies muscle aches, constipation, diarrhea, GI upset, fatigue, chest pain/pressure, exercise intolerance, dyspnea, palpitations, syncope and pedal edema.        The following portions of the patient's history were reviewed and updated as appropriate: allergies, current medications, past family history, past medical history, past social history, past surgical history and problem list.    Past Medical History:   Diagnosis Date   • Allergic rhinitis    • Conjunctivitis, acute    • HBP (high blood pressure)    • Hypertension    • Rhus dermatitis      History reviewed. No pertinent surgical history.  Family History   Problem Relation Age of Onset   • Hypertension Father    • Hypertension Brother    • Cancer Other      Social History     Tobacco Use   • Smoking status: Never   • Smokeless tobacco: Never   Substance Use Topics   • Alcohol use: Not on file       Current Outpatient Medications:   •  bisoprolol (ZEBeta) 5 MG tablet, Take 1 tablet by mouth Daily., Disp: 90 tablet, Rfl: 3    Objective   Vital Signs:   /73 (BP Location: Left arm, Patient Position: Sitting, Cuff Size: Adult)   Pulse 62   Temp 98.4 °F (36.9 °C) (Temporal)   Ht 175.3 cm (69\")   Wt 83.2 kg (183 lb 6.4 oz)   SpO2 97%   BMI 27.08 kg/m²           Physical Exam  Vitals and nursing note reviewed.   Constitutional:       General: He is not in acute distress.     Appearance: Normal " appearance. He is well-developed and normal weight. He is not diaphoretic.   HENT:      Head: Normocephalic and atraumatic.      Nose: No congestion.   Eyes:      Pupils: Pupils are equal, round, and reactive to light.   Neck:      Thyroid: No thyromegaly.   Cardiovascular:      Rate and Rhythm: Normal rate and regular rhythm.      Heart sounds: Normal heart sounds. No murmur heard.  Pulmonary:      Effort: Pulmonary effort is normal. No respiratory distress.      Breath sounds: Normal breath sounds. No wheezing or rhonchi.   Abdominal:      General: Bowel sounds are normal. There is no distension.      Palpations: Abdomen is soft.      Tenderness: There is no abdominal tenderness.   Musculoskeletal:         General: No swelling. Normal range of motion.      Cervical back: Normal range of motion.   Skin:     General: Skin is warm and dry.      Findings: No erythema.   Neurological:      Mental Status: He is alert and oriented to person, place, and time.   Psychiatric:         Behavior: Behavior normal.         Thought Content: Thought content normal.         Judgment: Judgment normal.          Result Review :     No visits with results within 7 Day(s) from this visit.   Latest known visit with results is:   Office Visit on 10/11/2021   Component Date Value Ref Range Status   • Total Cholesterol 10/11/2021 190  0 - 200 mg/dL Final   • Triglycerides 10/11/2021 230 (H)  0 - 150 mg/dL Final   • HDL Cholesterol 10/11/2021 53  40 - 60 mg/dL Final   • LDL Cholesterol  10/11/2021 98  0 - 100 mg/dL Final   • VLDL Cholesterol 10/11/2021 39  5 - 40 mg/dL Final   • LDL/HDL Ratio 10/11/2021 1.72   Final   • Glucose 10/11/2021 85  65 - 99 mg/dL Final   • BUN 10/11/2021 17  6 - 20 mg/dL Final   • Creatinine 10/11/2021 0.85  0.76 - 1.27 mg/dL Final   • Sodium 10/11/2021 140  136 - 145 mmol/L Final   • Potassium 10/11/2021 4.4  3.5 - 5.2 mmol/L Final   • Chloride 10/11/2021 102  98 - 107 mmol/L Final   • CO2 10/11/2021 26.8  22.0 -  29.0 mmol/L Final   • Calcium 10/11/2021 9.5  8.6 - 10.5 mg/dL Final   • Total Protein 10/11/2021 7.5  6.0 - 8.5 g/dL Final   • Albumin 10/11/2021 5.20  3.50 - 5.20 g/dL Final   • ALT (SGPT) 10/11/2021 24  1 - 41 U/L Final   • AST (SGOT) 10/11/2021 23  1 - 40 U/L Final   • Alkaline Phosphatase 10/11/2021 54  39 - 117 U/L Final   • Total Bilirubin 10/11/2021 0.4  0.0 - 1.2 mg/dL Final   • eGFR Non African Amer 10/11/2021 104  >60 mL/min/1.73 Final   • Globulin 10/11/2021 2.3  gm/dL Final   • A/G Ratio 10/11/2021 2.3  g/dL Final   • BUN/Creatinine Ratio 10/11/2021 20.0  7.0 - 25.0 Final   • Anion Gap 10/11/2021 11.2  5.0 - 15.0 mmol/L Final   • WBC 10/11/2021 5.73  3.40 - 10.80 10*3/mm3 Final   • RBC 10/11/2021 4.96  4.14 - 5.80 10*6/mm3 Final   • Hemoglobin 10/11/2021 14.5  13.0 - 17.7 g/dL Final   • Hematocrit 10/11/2021 42.6  37.5 - 51.0 % Final   • MCV 10/11/2021 85.9  79.0 - 97.0 fL Final   • MCH 10/11/2021 29.2  26.6 - 33.0 pg Final   • MCHC 10/11/2021 34.0  31.5 - 35.7 g/dL Final   • RDW 10/11/2021 12.4  12.3 - 15.4 % Final   • RDW-SD 10/11/2021 38.3  37.0 - 54.0 fl Final   • MPV 10/11/2021 11.6  6.0 - 12.0 fL Final   • Platelets 10/11/2021 214  140 - 450 10*3/mm3 Final   • TSH 10/11/2021 0.911  0.270 - 4.200 uIU/mL Final   • Hepatitis C Ab 10/11/2021 Non-Reactive  Non-Reactive Final                  BMI is >= 25 and <30. (Overweight) The following options were offered after discussion;: exercise counseling/recommendations and nutrition counseling/recommendations           Assessment and Plan    Diagnoses and all orders for this visit:    1. Preventative health care (Primary)  -     Lipid Panel  -     Comprehensive Metabolic Panel  -     CBC (No Diff)  -     TSH    2. Essential hypertension  Comments:  BP stable, patient is tolerating bisoprolol well, continue/refill  Orders:  -     Comprehensive Metabolic Panel  -     CBC (No Diff)  -     TSH  -     bisoprolol (ZEBeta) 5 MG tablet; Take 1 tablet by mouth  Daily.  Dispense: 90 tablet; Refill: 3    3. Mixed hyperlipidemia  -     Lipid Panel      Conditions stable, rf meds as above  Labs reviewed from 2021 and essentially stable  Age appropriate preventative counseling provided, including healthy lifestyle modifications and exercise  Patient will get flu shot at pharmacy        I spent 20 minutes caring for Brandon Briggs on this date of service. This time includes time spent by me in the following activities: preparing for the visit, reviewing tests, performing a medically appropriate examination and/or evaluation , counseling and educating the patient/family/caregiver, ordering medications, tests, or procedures and documenting information in the medical record        Follow Up {Instructions Charge Capture  Follow-up Communications :23}    Return in about 1 year (around 11/1/2023) for Annual physical.  Patient was given instructions and counseling regarding his condition or for health maintenance advice. Please see specific information pulled into the AVS if appropriate.        Part of this note may be an electronic transcription/translation of spoken language to printed text using the Dragon Dictation System

## 2022-11-02 LAB
ALBUMIN SERPL-MCNC: 5 G/DL (ref 3.5–5.2)
ALBUMIN/GLOB SERPL: 2.3 G/DL
ALP SERPL-CCNC: 53 U/L (ref 39–117)
ALT SERPL W P-5'-P-CCNC: 28 U/L (ref 1–41)
ANION GAP SERPL CALCULATED.3IONS-SCNC: 6.5 MMOL/L (ref 5–15)
AST SERPL-CCNC: 27 U/L (ref 1–40)
BILIRUB SERPL-MCNC: 0.4 MG/DL (ref 0–1.2)
BUN SERPL-MCNC: 13 MG/DL (ref 6–20)
BUN/CREAT SERPL: 14.4 (ref 7–25)
CALCIUM SPEC-SCNC: 9.5 MG/DL (ref 8.6–10.5)
CHLORIDE SERPL-SCNC: 104 MMOL/L (ref 98–107)
CHOLEST SERPL-MCNC: 199 MG/DL (ref 0–200)
CO2 SERPL-SCNC: 30.5 MMOL/L (ref 22–29)
CREAT SERPL-MCNC: 0.9 MG/DL (ref 0.76–1.27)
DEPRECATED RDW RBC AUTO: 40.4 FL (ref 37–54)
EGFRCR SERPLBLD CKD-EPI 2021: 115.7 ML/MIN/1.73
ERYTHROCYTE [DISTWIDTH] IN BLOOD BY AUTOMATED COUNT: 12.8 % (ref 12.3–15.4)
GLOBULIN UR ELPH-MCNC: 2.2 GM/DL
GLUCOSE SERPL-MCNC: 99 MG/DL (ref 65–99)
HCT VFR BLD AUTO: 45.6 % (ref 37.5–51)
HDLC SERPL-MCNC: 60 MG/DL (ref 40–60)
HGB BLD-MCNC: 15.4 G/DL (ref 13–17.7)
LDLC SERPL CALC-MCNC: 125 MG/DL (ref 0–100)
LDLC/HDLC SERPL: 2.05 {RATIO}
MCH RBC QN AUTO: 29.4 PG (ref 26.6–33)
MCHC RBC AUTO-ENTMCNC: 33.8 G/DL (ref 31.5–35.7)
MCV RBC AUTO: 87 FL (ref 79–97)
PLATELET # BLD AUTO: 207 10*3/MM3 (ref 140–450)
PMV BLD AUTO: 11 FL (ref 6–12)
POTASSIUM SERPL-SCNC: 5.1 MMOL/L (ref 3.5–5.2)
PROT SERPL-MCNC: 7.2 G/DL (ref 6–8.5)
RBC # BLD AUTO: 5.24 10*6/MM3 (ref 4.14–5.8)
SODIUM SERPL-SCNC: 141 MMOL/L (ref 136–145)
TRIGL SERPL-MCNC: 80 MG/DL (ref 0–150)
TSH SERPL DL<=0.05 MIU/L-ACNC: 0.95 UIU/ML (ref 0.27–4.2)
VLDLC SERPL-MCNC: 14 MG/DL (ref 5–40)
WBC NRBC COR # BLD: 4.77 10*3/MM3 (ref 3.4–10.8)

## 2022-12-24 DIAGNOSIS — I10 ESSENTIAL HYPERTENSION: ICD-10-CM

## 2022-12-27 RX ORDER — BISOPROLOL FUMARATE 5 MG/1
5 TABLET, FILM COATED ORAL DAILY
Qty: 90 TABLET | Refills: 3 | Status: SHIPPED | OUTPATIENT
Start: 2022-12-27

## 2023-11-06 ENCOUNTER — OFFICE VISIT (OUTPATIENT)
Dept: FAMILY MEDICINE CLINIC | Facility: CLINIC | Age: 33
End: 2023-11-06
Payer: COMMERCIAL

## 2023-11-06 VITALS
DIASTOLIC BLOOD PRESSURE: 82 MMHG | WEIGHT: 177 LBS | BODY MASS INDEX: 26.22 KG/M2 | HEIGHT: 69 IN | SYSTOLIC BLOOD PRESSURE: 141 MMHG

## 2023-11-06 DIAGNOSIS — Z23 NEED FOR INFLUENZA VACCINATION: ICD-10-CM

## 2023-11-06 DIAGNOSIS — I10 ESSENTIAL HYPERTENSION: ICD-10-CM

## 2023-11-06 DIAGNOSIS — Z00.00 PREVENTATIVE HEALTH CARE: Primary | ICD-10-CM

## 2023-11-06 PROCEDURE — 84443 ASSAY THYROID STIM HORMONE: CPT | Performed by: NURSE PRACTITIONER

## 2023-11-06 PROCEDURE — 85027 COMPLETE CBC AUTOMATED: CPT | Performed by: NURSE PRACTITIONER

## 2023-11-06 PROCEDURE — 80061 LIPID PANEL: CPT | Performed by: NURSE PRACTITIONER

## 2023-11-06 PROCEDURE — 80053 COMPREHEN METABOLIC PANEL: CPT | Performed by: NURSE PRACTITIONER

## 2023-11-06 RX ORDER — BISOPROLOL FUMARATE 5 MG/1
5 TABLET, FILM COATED ORAL DAILY
Qty: 90 TABLET | Refills: 3 | Status: SHIPPED | OUTPATIENT
Start: 2023-11-06

## 2023-11-06 NOTE — PROGRESS NOTES
"Chief Complaint  Chief Complaint   Patient presents with    Annual Exam           Subjective          Brandon Briggs presents to [facility] for   History of Present Illness      Patient presents for annual exam. He is doing well. He is on Bisoprolol for hypertension, blood pressure has been controlled, denies chest pain, headache, shortness of air, palpitations and swelling of extremities.       The following portions of the patient's history were reviewed and updated as appropriate: allergies, current medications, past family history, past medical history, past social history, past surgical history and problem list.    Past Medical History:   Diagnosis Date    Allergic rhinitis     Conjunctivitis, acute     HBP (high blood pressure)     Hypertension     Rhus dermatitis      History reviewed. No pertinent surgical history.  Family History   Problem Relation Age of Onset    Hypertension Father     Hypertension Brother     Cancer Other      Social History     Tobacco Use    Smoking status: Never    Smokeless tobacco: Never   Substance Use Topics    Alcohol use: Not on file       Current Outpatient Medications:     bisoprolol (ZEBeta) 5 MG tablet, Take 1 tablet by mouth Daily., Disp: 90 tablet, Rfl: 3    Objective   Vital Signs:   /82 (BP Location: Left arm, Patient Position: Sitting, Cuff Size: Adult)   Ht 175.3 cm (69\")   Wt 80.3 kg (177 lb)   BMI 26.14 kg/m²           Physical Exam  Constitutional:       General: He is not in acute distress.     Appearance: Normal appearance. He is well-developed. He is not ill-appearing or diaphoretic.   HENT:      Head: Normocephalic.   Eyes:      Conjunctiva/sclera: Conjunctivae normal.      Pupils: Pupils are equal, round, and reactive to light.   Neck:      Thyroid: No thyromegaly.      Vascular: No JVD.   Cardiovascular:      Rate and Rhythm: Normal rate and regular rhythm.      Heart sounds: Normal heart sounds. No murmur " heard.  Pulmonary:      Effort: Pulmonary effort is normal. No respiratory distress.      Breath sounds: Normal breath sounds. No wheezing or rhonchi.   Abdominal:      General: Bowel sounds are normal. There is no distension.      Palpations: Abdomen is soft.      Tenderness: There is no abdominal tenderness.   Musculoskeletal:         General: No swelling or tenderness. Normal range of motion.      Cervical back: Normal range of motion and neck supple. No tenderness.   Lymphadenopathy:      Cervical: No cervical adenopathy.   Skin:     General: Skin is warm and dry.      Coloration: Skin is not jaundiced.      Findings: No erythema or rash.   Neurological:      General: No focal deficit present.      Mental Status: He is alert and oriented to person, place, and time. Mental status is at baseline.      Sensory: No sensory deficit.      Motor: No weakness.      Gait: Gait normal.   Psychiatric:         Mood and Affect: Mood normal.         Behavior: Behavior normal.         Thought Content: Thought content normal.         Judgment: Judgment normal.          Result Review :     No visits with results within 7 Day(s) from this visit.   Latest known visit with results is:   Office Visit on 11/01/2022   Component Date Value Ref Range Status    Total Cholesterol 11/01/2022 199  0 - 200 mg/dL Final    Triglycerides 11/01/2022 80  0 - 150 mg/dL Final    HDL Cholesterol 11/01/2022 60  40 - 60 mg/dL Final    LDL Cholesterol  11/01/2022 125 (H)  0 - 100 mg/dL Final    VLDL Cholesterol 11/01/2022 14  5 - 40 mg/dL Final    LDL/HDL Ratio 11/01/2022 2.05   Final    Glucose 11/01/2022 99  65 - 99 mg/dL Final    BUN 11/01/2022 13  6 - 20 mg/dL Final    Creatinine 11/01/2022 0.90  0.76 - 1.27 mg/dL Final    Sodium 11/01/2022 141  136 - 145 mmol/L Final    Potassium 11/01/2022 5.1  3.5 - 5.2 mmol/L Final    Chloride 11/01/2022 104  98 - 107 mmol/L Final    CO2 11/01/2022 30.5 (H)  22.0 - 29.0 mmol/L Final    Calcium 11/01/2022 9.5   8.6 - 10.5 mg/dL Final    Total Protein 11/01/2022 7.2  6.0 - 8.5 g/dL Final    Albumin 11/01/2022 5.00  3.50 - 5.20 g/dL Final    ALT (SGPT) 11/01/2022 28  1 - 41 U/L Final    AST (SGOT) 11/01/2022 27  1 - 40 U/L Final    Alkaline Phosphatase 11/01/2022 53  39 - 117 U/L Final    Total Bilirubin 11/01/2022 0.4  0.0 - 1.2 mg/dL Final    Globulin 11/01/2022 2.2  gm/dL Final    A/G Ratio 11/01/2022 2.3  g/dL Final    BUN/Creatinine Ratio 11/01/2022 14.4  7.0 - 25.0 Final    Anion Gap 11/01/2022 6.5  5.0 - 15.0 mmol/L Final    eGFR 11/01/2022 115.7  >60.0 mL/min/1.73 Final    National Kidney Foundation and American Society of Nephrology (ASN) Task Force recommended calculation based on the Chronic Kidney Disease Epidemiology Collaboration (CKD-EPI) equation refit without adjustment for race.    WBC 11/01/2022 4.77  3.40 - 10.80 10*3/mm3 Final    RBC 11/01/2022 5.24  4.14 - 5.80 10*6/mm3 Final    Hemoglobin 11/01/2022 15.4  13.0 - 17.7 g/dL Final    Hematocrit 11/01/2022 45.6  37.5 - 51.0 % Final    MCV 11/01/2022 87.0  79.0 - 97.0 fL Final    MCH 11/01/2022 29.4  26.6 - 33.0 pg Final    MCHC 11/01/2022 33.8  31.5 - 35.7 g/dL Final    RDW 11/01/2022 12.8  12.3 - 15.4 % Final    RDW-SD 11/01/2022 40.4  37.0 - 54.0 fl Final    MPV 11/01/2022 11.0  6.0 - 12.0 fL Final    Platelets 11/01/2022 207  140 - 450 10*3/mm3 Final    TSH 11/01/2022 0.951  0.270 - 4.200 uIU/mL Final                  BMI is >= 25 and <30. (Overweight) The following options were offered after discussion;: exercise counseling/recommendations and nutrition counseling/recommendations           Assessment and Plan    Diagnoses and all orders for this visit:    1. Preventative health care (Primary)  Comments:  Labs today as ordered  Flu shot today  Orders:  -     Lipid Panel  -     Comprehensive Metabolic Panel  -     CBC (No Diff)  -     TSH    2. Need for influenza vaccination  -     Fluzone >6 Months ([id number])    3. Essential hypertension  Comments:  BP  borderline elevated, stable outside of the office, patient is tolerating bisoprolol well, continue/refill  Orders:  -     Lipid Panel  -     Comprehensive Metabolic Panel  -     CBC (No Diff)  -     TSH  -     bisoprolol (ZEBeta) 5 MG tablet; Take 1 tablet by mouth Daily.  Dispense: 90 tablet; Refill: 3        I spent 30 minutes caring for Brandon Briggs on this date of service. This time includes time spent by me in the following activities: preparing for the visit, reviewing tests, performing a medically appropriate examination and/or evaluation , counseling and educating the patient/family/caregiver, ordering medications, tests, or procedures and documenting information in the medical record        Follow Up     Return in about 1 year (around 11/6/2024) for Annual physical.  Patient was given instructions and counseling regarding his condition or for health maintenance advice. Please see specific information pulled into the AVS if appropriate.        Part of this note may be an electronic transcription/translation of spoken language to printed text using the Dragon Dictation System

## 2023-11-06 NOTE — PROGRESS NOTES
Venipuncture Blood Specimen Collection  Venipuncture performed in the left arm by Dominique Zaragoza MA with good hemostasis. Patient tolerated the procedure well without complications.   11/06/23   Dominique Zaragoza MA

## 2023-11-07 LAB
ALBUMIN SERPL-MCNC: 5.2 G/DL (ref 3.5–5.2)
ALBUMIN/GLOB SERPL: 2.3 G/DL
ALP SERPL-CCNC: 57 U/L (ref 39–117)
ALT SERPL W P-5'-P-CCNC: 29 U/L (ref 1–41)
ANION GAP SERPL CALCULATED.3IONS-SCNC: 9.9 MMOL/L (ref 5–15)
AST SERPL-CCNC: 24 U/L (ref 1–40)
BILIRUB SERPL-MCNC: 0.5 MG/DL (ref 0–1.2)
BUN SERPL-MCNC: 16 MG/DL (ref 6–20)
BUN/CREAT SERPL: 16.8 (ref 7–25)
CALCIUM SPEC-SCNC: 10.1 MG/DL (ref 8.6–10.5)
CHLORIDE SERPL-SCNC: 100 MMOL/L (ref 98–107)
CHOLEST SERPL-MCNC: 214 MG/DL (ref 0–200)
CO2 SERPL-SCNC: 28.1 MMOL/L (ref 22–29)
CREAT SERPL-MCNC: 0.95 MG/DL (ref 0.76–1.27)
DEPRECATED RDW RBC AUTO: 37.2 FL (ref 37–54)
EGFRCR SERPLBLD CKD-EPI 2021: 107.7 ML/MIN/1.73
ERYTHROCYTE [DISTWIDTH] IN BLOOD BY AUTOMATED COUNT: 12.1 % (ref 12.3–15.4)
GLOBULIN UR ELPH-MCNC: 2.3 GM/DL
GLUCOSE SERPL-MCNC: 103 MG/DL (ref 65–99)
HCT VFR BLD AUTO: 45.8 % (ref 37.5–51)
HDLC SERPL-MCNC: 53 MG/DL (ref 40–60)
HGB BLD-MCNC: 15.6 G/DL (ref 13–17.7)
LDLC SERPL CALC-MCNC: 134 MG/DL (ref 0–100)
LDLC/HDLC SERPL: 2.46 {RATIO}
MCH RBC QN AUTO: 29.1 PG (ref 26.6–33)
MCHC RBC AUTO-ENTMCNC: 34.1 G/DL (ref 31.5–35.7)
MCV RBC AUTO: 85.4 FL (ref 79–97)
PLATELET # BLD AUTO: 219 10*3/MM3 (ref 140–450)
PMV BLD AUTO: 11.5 FL (ref 6–12)
POTASSIUM SERPL-SCNC: 4.8 MMOL/L (ref 3.5–5.2)
PROT SERPL-MCNC: 7.5 G/DL (ref 6–8.5)
RBC # BLD AUTO: 5.36 10*6/MM3 (ref 4.14–5.8)
SODIUM SERPL-SCNC: 138 MMOL/L (ref 136–145)
TRIGL SERPL-MCNC: 152 MG/DL (ref 0–150)
TSH SERPL DL<=0.05 MIU/L-ACNC: 1.51 UIU/ML (ref 0.27–4.2)
VLDLC SERPL-MCNC: 27 MG/DL (ref 5–40)
WBC NRBC COR # BLD: 5.33 10*3/MM3 (ref 3.4–10.8)

## 2024-01-16 DIAGNOSIS — I10 ESSENTIAL HYPERTENSION: ICD-10-CM

## 2024-01-16 RX ORDER — BISOPROLOL FUMARATE 5 MG/1
5 TABLET, FILM COATED ORAL DAILY
Qty: 90 TABLET | Refills: 3 | Status: SHIPPED | OUTPATIENT
Start: 2024-01-16

## 2024-09-11 ENCOUNTER — OFFICE VISIT (OUTPATIENT)
Dept: FAMILY MEDICINE CLINIC | Facility: CLINIC | Age: 34
End: 2024-09-11
Payer: COMMERCIAL

## 2024-09-11 VITALS
SYSTOLIC BLOOD PRESSURE: 118 MMHG | DIASTOLIC BLOOD PRESSURE: 80 MMHG | RESPIRATION RATE: 18 BRPM | BODY MASS INDEX: 25.86 KG/M2 | HEART RATE: 67 BPM | OXYGEN SATURATION: 98 % | HEIGHT: 69 IN | TEMPERATURE: 98.3 F | WEIGHT: 174.6 LBS

## 2024-09-11 DIAGNOSIS — I10 ESSENTIAL HYPERTENSION: Primary | ICD-10-CM

## 2024-09-11 PROCEDURE — 99213 OFFICE O/P EST LOW 20 MIN: CPT | Performed by: NURSE PRACTITIONER

## 2024-09-11 NOTE — PROGRESS NOTES
"Chief Complaint  Chief Complaint   Patient presents with    Hypertension     Home bp reading slightly elevated. 130-140/90           Subjective          Brandon Briggs presents to [facility] for   History of Present Illness      Patient presents for visit to discuss hypertension, patient checks blood pressure 2-3 times weekly at home, always 120s/60-80's, he has never seen anything higher.  Yesterday bp was elevated 150/90 at dentist, he states he was not stressed or anxious, they rescheduled his appointment for him to have discussion with PCP about blood pressure, but when he got home BP was still 140/80, later in the day had returned back to 120/60.  He is taking bisoprolol 5 mg daily. Bp in good range today at home 120/60 this a.m.  He denies chest pain, shortness of air, palpitations and swelling of the extremities      The following portions of the patient's history were reviewed and updated as appropriate: allergies, current medications, past family history, past medical history, past social history, past surgical history and problem list.    Past Medical History:   Diagnosis Date    Allergic rhinitis     Conjunctivitis, acute     HBP (high blood pressure)     Hypertension     Rhus dermatitis      History reviewed. No pertinent surgical history.  Family History   Problem Relation Age of Onset    Hypertension Father     Hypertension Brother      Social History     Tobacco Use    Smoking status: Never    Smokeless tobacco: Never   Substance Use Topics    Alcohol use: Never       Current Outpatient Medications:     bisoprolol (ZEBeta) 5 MG tablet, TAKE 1 TABLET BY MOUTH DAILY, Disp: 90 tablet, Rfl: 3    Objective   Vital Signs:   /80 (BP Location: Left arm, Patient Position: Sitting, Cuff Size: Adult)   Pulse 67   Temp 98.3 °F (36.8 °C) (Oral)   Resp 18   Ht 175.3 cm (69\")   Wt 79.2 kg (174 lb 9.6 oz)   SpO2 98% Comment: Room air  BMI 25.78 kg/m²           Physical " Exam  Constitutional:       General: He is not in acute distress.     Appearance: Normal appearance. He is not ill-appearing.   Pulmonary:      Effort: Pulmonary effort is normal.   Musculoskeletal:         General: Normal range of motion.      Cervical back: Normal range of motion.   Skin:     General: Skin is warm and dry.   Neurological:      General: No focal deficit present.      Mental Status: He is alert.   Psychiatric:         Mood and Affect: Mood normal.         Behavior: Behavior normal.         Thought Content: Thought content normal.         Judgment: Judgment normal.          Result Review :     No visits with results within 7 Day(s) from this visit.   Latest known visit with results is:   Office Visit on 11/06/2023   Component Date Value Ref Range Status    Total Cholesterol 11/06/2023 214 (H)  0 - 200 mg/dL Final    Triglycerides 11/06/2023 152 (H)  0 - 150 mg/dL Final    HDL Cholesterol 11/06/2023 53  40 - 60 mg/dL Final    LDL Cholesterol  11/06/2023 134 (H)  0 - 100 mg/dL Final    VLDL Cholesterol 11/06/2023 27  5 - 40 mg/dL Final    LDL/HDL Ratio 11/06/2023 2.46   Final    Glucose 11/06/2023 103 (H)  65 - 99 mg/dL Final    BUN 11/06/2023 16  6 - 20 mg/dL Final    Creatinine 11/06/2023 0.95  0.76 - 1.27 mg/dL Final    Sodium 11/06/2023 138  136 - 145 mmol/L Final    Potassium 11/06/2023 4.8  3.5 - 5.2 mmol/L Final    Chloride 11/06/2023 100  98 - 107 mmol/L Final    CO2 11/06/2023 28.1  22.0 - 29.0 mmol/L Final    Calcium 11/06/2023 10.1  8.6 - 10.5 mg/dL Final    Total Protein 11/06/2023 7.5  6.0 - 8.5 g/dL Final    Albumin 11/06/2023 5.2  3.5 - 5.2 g/dL Final    ALT (SGPT) 11/06/2023 29  1 - 41 U/L Final    AST (SGOT) 11/06/2023 24  1 - 40 U/L Final    Alkaline Phosphatase 11/06/2023 57  39 - 117 U/L Final    Total Bilirubin 11/06/2023 0.5  0.0 - 1.2 mg/dL Final    Globulin 11/06/2023 2.3  gm/dL Final    A/G Ratio 11/06/2023 2.3  g/dL Final    BUN/Creatinine Ratio 11/06/2023 16.8  7.0 - 25.0  Final    Anion Gap 11/06/2023 9.9  5.0 - 15.0 mmol/L Final    eGFR 11/06/2023 107.7  >60.0 mL/min/1.73 Final    WBC 11/06/2023 5.33  3.40 - 10.80 10*3/mm3 Final    RBC 11/06/2023 5.36  4.14 - 5.80 10*6/mm3 Final    Hemoglobin 11/06/2023 15.6  13.0 - 17.7 g/dL Final    Hematocrit 11/06/2023 45.8  37.5 - 51.0 % Final    MCV 11/06/2023 85.4  79.0 - 97.0 fL Final    MCH 11/06/2023 29.1  26.6 - 33.0 pg Final    MCHC 11/06/2023 34.1  31.5 - 35.7 g/dL Final    RDW 11/06/2023 12.1 (L)  12.3 - 15.4 % Final    RDW-SD 11/06/2023 37.2  37.0 - 54.0 fl Final    MPV 11/06/2023 11.5  6.0 - 12.0 fL Final    Platelets 11/06/2023 219  140 - 450 10*3/mm3 Final    TSH 11/06/2023 1.510  0.270 - 4.200 uIU/mL Final                              Assessment and Plan    Diagnoses and all orders for this visit:    1. Essential hypertension (Primary)    Bp log discussed/reviewed, bp is well controlled  D/w pt external stressors even though not feeling stressed/anxious or worried can often briefly raise blood pressure and is normal if returns to baseline within 2 hours  No change to bisoprolol today  Continue to keep BP log and monitor a.m./p.m. intermittently but does not have to check every day  Call or message if BP consistently > 140 systolic    I spent 20 minutes caring for Brandon Briggs on this date of service. This time includes time spent by me in the following activities: preparing for the visit, reviewing tests, performing a medically appropriate examination and/or evaluation , counseling and educating the patient/family/caregiver, ordering medications, tests, or procedures and documenting information in the medical record        Follow Up     Return for Next scheduled follow up for annual exam 11/7/2024 or earlier if needed .  Patient was given instructions and counseling regarding his condition or for health maintenance advice. Please see specific information pulled into the AVS if appropriate.        Part of this note may be an  electronic transcription/translation of spoken language to printed text using the Dragon Dictation System

## 2024-11-07 ENCOUNTER — OFFICE VISIT (OUTPATIENT)
Dept: FAMILY MEDICINE CLINIC | Facility: CLINIC | Age: 34
End: 2024-11-07
Payer: COMMERCIAL

## 2024-11-07 ENCOUNTER — LAB (OUTPATIENT)
Dept: FAMILY MEDICINE CLINIC | Facility: CLINIC | Age: 34
End: 2024-11-07
Payer: COMMERCIAL

## 2024-11-07 VITALS
WEIGHT: 174 LBS | BODY MASS INDEX: 25.77 KG/M2 | OXYGEN SATURATION: 98 % | HEART RATE: 61 BPM | HEIGHT: 69 IN | SYSTOLIC BLOOD PRESSURE: 127 MMHG | DIASTOLIC BLOOD PRESSURE: 81 MMHG

## 2024-11-07 DIAGNOSIS — Z00.00 PREVENTATIVE HEALTH CARE: Primary | ICD-10-CM

## 2024-11-07 DIAGNOSIS — I10 ESSENTIAL HYPERTENSION: ICD-10-CM

## 2024-11-07 LAB
DEPRECATED RDW RBC AUTO: 39.2 FL (ref 37–54)
ERYTHROCYTE [DISTWIDTH] IN BLOOD BY AUTOMATED COUNT: 12.3 % (ref 12.3–15.4)
HCT VFR BLD AUTO: 44.6 % (ref 37.5–51)
HGB BLD-MCNC: 15.2 G/DL (ref 13–17.7)
MCH RBC QN AUTO: 29.9 PG (ref 26.6–33)
MCHC RBC AUTO-ENTMCNC: 34.1 G/DL (ref 31.5–35.7)
MCV RBC AUTO: 87.8 FL (ref 79–97)
PLATELET # BLD AUTO: 216 10*3/MM3 (ref 140–450)
PMV BLD AUTO: 11.1 FL (ref 6–12)
RBC # BLD AUTO: 5.08 10*6/MM3 (ref 4.14–5.8)
WBC NRBC COR # BLD AUTO: 5.97 10*3/MM3 (ref 3.4–10.8)

## 2024-11-07 PROCEDURE — 80053 COMPREHEN METABOLIC PANEL: CPT | Performed by: NURSE PRACTITIONER

## 2024-11-07 PROCEDURE — 85027 COMPLETE CBC AUTOMATED: CPT | Performed by: NURSE PRACTITIONER

## 2024-11-07 PROCEDURE — 80061 LIPID PANEL: CPT | Performed by: NURSE PRACTITIONER

## 2024-11-07 PROCEDURE — 99395 PREV VISIT EST AGE 18-39: CPT | Performed by: NURSE PRACTITIONER

## 2024-11-07 PROCEDURE — 84443 ASSAY THYROID STIM HORMONE: CPT | Performed by: NURSE PRACTITIONER

## 2024-11-07 PROCEDURE — 36415 COLL VENOUS BLD VENIPUNCTURE: CPT | Performed by: NURSE PRACTITIONER

## 2024-11-07 NOTE — PROGRESS NOTES
"Chief Complaint  Chief Complaint   Patient presents with    Annual Exam    Hypertension           Subjective          Brandon Briggs presents to [facility] for   History of Present Illness    Patient presents for annual exam.  He is doing well, has no complaints    HTN, stable on bisoprolol, taking daily as directed.  Checks BP regularly at home, always in 120's/80's. Denies chest pain, headache, shortness of air, palpitations and swelling of extremities.       The following portions of the patient's history were reviewed and updated as appropriate: allergies, current medications, past family history, past medical history, past social history, past surgical history and problem list.    Past Medical History:   Diagnosis Date    Allergic rhinitis     Conjunctivitis, acute     HBP (high blood pressure)     Hypertension     Rhus dermatitis      History reviewed. No pertinent surgical history.  Family History   Problem Relation Age of Onset    Hypertension Father     Hypertension Brother      Social History     Tobacco Use    Smoking status: Never    Smokeless tobacco: Never   Substance Use Topics    Alcohol use: Never       Current Outpatient Medications:     bisoprolol (ZEBeta) 5 MG tablet, TAKE 1 TABLET BY MOUTH DAILY, Disp: 90 tablet, Rfl: 3    Objective   Vital Signs:   /81 (BP Location: Left arm, Patient Position: Sitting, Cuff Size: Adult)   Pulse 61   Ht 175.3 cm (69\")   Wt 78.9 kg (174 lb)   SpO2 98%   BMI 25.70 kg/m²           Physical Exam  Constitutional:       General: He is not in acute distress.     Appearance: Normal appearance. He is well-developed. He is not ill-appearing or diaphoretic.   HENT:      Head: Normocephalic.   Eyes:      Conjunctiva/sclera: Conjunctivae normal.      Pupils: Pupils are equal, round, and reactive to light.   Neck:      Thyroid: No thyromegaly.      Vascular: No JVD.   Cardiovascular:      Rate and Rhythm: Normal rate and regular " rhythm.      Heart sounds: Normal heart sounds. No murmur heard.  Pulmonary:      Effort: Pulmonary effort is normal. No respiratory distress.      Breath sounds: Normal breath sounds. No wheezing or rhonchi.   Abdominal:      General: Bowel sounds are normal. There is no distension.      Palpations: Abdomen is soft.      Tenderness: There is no abdominal tenderness.   Musculoskeletal:         General: No swelling or tenderness. Normal range of motion.      Cervical back: Normal range of motion and neck supple. No tenderness.   Lymphadenopathy:      Cervical: No cervical adenopathy.   Skin:     General: Skin is warm and dry.      Coloration: Skin is not jaundiced.      Findings: No erythema or rash.   Neurological:      General: No focal deficit present.      Mental Status: He is alert and oriented to person, place, and time. Mental status is at baseline.      Sensory: No sensory deficit.      Motor: No weakness.      Gait: Gait normal.   Psychiatric:         Mood and Affect: Mood normal.         Behavior: Behavior normal.         Thought Content: Thought content normal.         Judgment: Judgment normal.          Result Review :     No visits with results within 7 Day(s) from this visit.   Latest known visit with results is:   Office Visit on 11/06/2023   Component Date Value Ref Range Status    Total Cholesterol 11/06/2023 214 (H)  0 - 200 mg/dL Final    Triglycerides 11/06/2023 152 (H)  0 - 150 mg/dL Final    HDL Cholesterol 11/06/2023 53  40 - 60 mg/dL Final    LDL Cholesterol  11/06/2023 134 (H)  0 - 100 mg/dL Final    VLDL Cholesterol 11/06/2023 27  5 - 40 mg/dL Final    LDL/HDL Ratio 11/06/2023 2.46   Final    Glucose 11/06/2023 103 (H)  65 - 99 mg/dL Final    BUN 11/06/2023 16  6 - 20 mg/dL Final    Creatinine 11/06/2023 0.95  0.76 - 1.27 mg/dL Final    Sodium 11/06/2023 138  136 - 145 mmol/L Final    Potassium 11/06/2023 4.8  3.5 - 5.2 mmol/L Final    Chloride 11/06/2023 100  98 - 107 mmol/L Final    CO2  11/06/2023 28.1  22.0 - 29.0 mmol/L Final    Calcium 11/06/2023 10.1  8.6 - 10.5 mg/dL Final    Total Protein 11/06/2023 7.5  6.0 - 8.5 g/dL Final    Albumin 11/06/2023 5.2  3.5 - 5.2 g/dL Final    ALT (SGPT) 11/06/2023 29  1 - 41 U/L Final    AST (SGOT) 11/06/2023 24  1 - 40 U/L Final    Alkaline Phosphatase 11/06/2023 57  39 - 117 U/L Final    Total Bilirubin 11/06/2023 0.5  0.0 - 1.2 mg/dL Final    Globulin 11/06/2023 2.3  gm/dL Final    A/G Ratio 11/06/2023 2.3  g/dL Final    BUN/Creatinine Ratio 11/06/2023 16.8  7.0 - 25.0 Final    Anion Gap 11/06/2023 9.9  5.0 - 15.0 mmol/L Final    eGFR 11/06/2023 107.7  >60.0 mL/min/1.73 Final    WBC 11/06/2023 5.33  3.40 - 10.80 10*3/mm3 Final    RBC 11/06/2023 5.36  4.14 - 5.80 10*6/mm3 Final    Hemoglobin 11/06/2023 15.6  13.0 - 17.7 g/dL Final    Hematocrit 11/06/2023 45.8  37.5 - 51.0 % Final    MCV 11/06/2023 85.4  79.0 - 97.0 fL Final    MCH 11/06/2023 29.1  26.6 - 33.0 pg Final    MCHC 11/06/2023 34.1  31.5 - 35.7 g/dL Final    RDW 11/06/2023 12.1 (L)  12.3 - 15.4 % Final    RDW-SD 11/06/2023 37.2  37.0 - 54.0 fl Final    MPV 11/06/2023 11.5  6.0 - 12.0 fL Final    Platelets 11/06/2023 219  140 - 450 10*3/mm3 Final    TSH 11/06/2023 1.510  0.270 - 4.200 uIU/mL Final                  BMI is >= 25 and <30. (Overweight) The following options were offered after discussion;: exercise counseling/recommendations and nutrition counseling/recommendations           Assessment and Plan    Diagnoses and all orders for this visit:    1. Preventative health care (Primary)  -     Lipid Panel  -     Comprehensive Metabolic Panel  -     CBC (No Diff)  -     TSH    2. Essential hypertension  -     Lipid Panel  -     Comprehensive Metabolic Panel  -     CBC (No Diff)  -     TSH      Conditions stable  Bp well controlled, cont bisoprolol, refill when needed  Labs today as ordered, will notify results  Declines flu shot today  Age appropriate preventative counseling provided, including  healthy lifestyle modifications and exercise      I spent 20 minutes caring for Brandon Briggs on this date of service. This time includes time spent by me in the following activities: preparing for the visit, reviewing tests, performing a medically appropriate examination and/or evaluation , counseling and educating the patient/family/caregiver, ordering medications, tests, or procedures and documenting information in the medical record        Follow Up     Return in about 1 year (around 11/7/2025) for Annual physical.  Patient was given instructions and counseling regarding his condition or for health maintenance advice. Please see specific information pulled into the AVS if appropriate.        Part of this note may be an electronic transcription/translation of spoken language to printed text using the Dragon Dictation System

## 2024-11-08 LAB
ALBUMIN SERPL-MCNC: 4.8 G/DL (ref 3.5–5.2)
ALBUMIN/GLOB SERPL: 2.1 G/DL
ALP SERPL-CCNC: 50 U/L (ref 39–117)
ALT SERPL W P-5'-P-CCNC: 23 U/L (ref 1–41)
ANION GAP SERPL CALCULATED.3IONS-SCNC: 9.7 MMOL/L (ref 5–15)
AST SERPL-CCNC: 24 U/L (ref 1–40)
BILIRUB SERPL-MCNC: 0.4 MG/DL (ref 0–1.2)
BUN SERPL-MCNC: 19 MG/DL (ref 6–20)
BUN/CREAT SERPL: 20.7 (ref 7–25)
CALCIUM SPEC-SCNC: 9.6 MG/DL (ref 8.6–10.5)
CHLORIDE SERPL-SCNC: 103 MMOL/L (ref 98–107)
CHOLEST SERPL-MCNC: 212 MG/DL (ref 0–200)
CO2 SERPL-SCNC: 26.3 MMOL/L (ref 22–29)
CREAT SERPL-MCNC: 0.92 MG/DL (ref 0.76–1.27)
EGFRCR SERPLBLD CKD-EPI 2021: 111.2 ML/MIN/1.73
GLOBULIN UR ELPH-MCNC: 2.3 GM/DL
GLUCOSE SERPL-MCNC: 101 MG/DL (ref 65–99)
HDLC SERPL-MCNC: 52 MG/DL (ref 40–60)
LDLC SERPL CALC-MCNC: 147 MG/DL (ref 0–100)
LDLC/HDLC SERPL: 2.8 {RATIO}
POTASSIUM SERPL-SCNC: 4.9 MMOL/L (ref 3.5–5.2)
PROT SERPL-MCNC: 7.1 G/DL (ref 6–8.5)
SODIUM SERPL-SCNC: 139 MMOL/L (ref 136–145)
TRIGL SERPL-MCNC: 71 MG/DL (ref 0–150)
TSH SERPL DL<=0.05 MIU/L-ACNC: 1.15 UIU/ML (ref 0.27–4.2)
VLDLC SERPL-MCNC: 13 MG/DL (ref 5–40)

## 2024-11-25 ENCOUNTER — TELEMEDICINE (OUTPATIENT)
Dept: FAMILY MEDICINE CLINIC | Facility: CLINIC | Age: 34
End: 2024-11-25
Payer: COMMERCIAL

## 2024-11-25 VITALS — TEMPERATURE: 103.4 F

## 2024-11-25 DIAGNOSIS — R05.1 ACUTE COUGH: ICD-10-CM

## 2024-11-25 DIAGNOSIS — Z20.828 EXPOSURE TO THE FLU: Primary | ICD-10-CM

## 2024-11-25 DIAGNOSIS — R50.9 FEVER, UNSPECIFIED FEVER CAUSE: ICD-10-CM

## 2024-11-25 PROCEDURE — 99213 OFFICE O/P EST LOW 20 MIN: CPT | Performed by: NURSE PRACTITIONER

## 2024-11-25 RX ORDER — OSELTAMIVIR PHOSPHATE 75 MG/1
75 CAPSULE ORAL 2 TIMES DAILY
Qty: 10 CAPSULE | Refills: 0 | Status: SHIPPED | OUTPATIENT
Start: 2024-11-25

## 2024-11-25 RX ORDER — BROMPHENIRAMINE MALEATE, PSEUDOEPHEDRINE HYDROCHLORIDE, AND DEXTROMETHORPHAN HYDROBROMIDE 2; 30; 10 MG/5ML; MG/5ML; MG/5ML
5 SYRUP ORAL 4 TIMES DAILY PRN
Qty: 120 ML | Refills: 0 | Status: SHIPPED | OUTPATIENT
Start: 2024-11-25

## 2024-11-25 NOTE — PROGRESS NOTES
Chief Complaint   Patient presents with    Cough    Fever       You have chosen to receive care through a telemedicine visit. Do you consent to use a telemedicine visit for your medical care today? Yes.  The patient was located in his home, the provider located at [facility] primary care office in [place], [place]    History of presenting illness    Pt presents with cough, congestion, body aches, fever Tmax 103.4.  His wife tested positive for influenza A this morning.  He is symptoms started on 11/22/2024. He did not get a flu shot this year.      The following portions of the patient's history were reviewed and updated as appropriate: allergies, current medications, past family history, past medical history, past social history, past surgical history and problem list.    Past Medical History:   Diagnosis Date    Allergic rhinitis     Conjunctivitis, acute     HBP (high blood pressure)     Hypertension     Rhus dermatitis      History reviewed. No pertinent surgical history.  Family History   Problem Relation Age of Onset    Hypertension Father     Hypertension Brother      Social History     Tobacco Use    Smoking status: Never    Smokeless tobacco: Never   Substance Use Topics    Alcohol use: Never         Current Outpatient Medications:     bisoprolol (ZEBeta) 5 MG tablet, TAKE 1 TABLET BY MOUTH DAILY, Disp: 90 tablet, Rfl: 3    brompheniramine-pseudoephedrine-DM 30-2-10 MG/5ML syrup, Take 5 mL by mouth 4 (Four) Times a Day As Needed for Congestion or Cough., Disp: 120 mL, Rfl: 0    oseltamivir (Tamiflu) 75 MG capsule, Take 1 capsule by mouth 2 (Two) Times a Day., Disp: 10 capsule, Rfl: 0          Vitals:    11/25/24 1302   Temp: (!) 103.4 °F (39.7 °C)     There is no height or weight on file to calculate BMI.    Physical Exam  Constitutional:       General: He is not in acute distress.     Appearance: He is ill-appearing.      Comments: Exam otherwise deferred through video/audio visit   Pulmonary:       Effort: Pulmonary effort is normal. No respiratory distress.   Neurological:      Mental Status: He is alert and oriented to person, place, and time.   Psychiatric:         Behavior: Behavior normal.         Thought Content: Thought content normal.         Judgment: Judgment normal.         No visits with results within 7 Day(s) from this visit.   Latest known visit with results is:   Office Visit on 11/07/2024   Component Date Value Ref Range Status    Total Cholesterol 11/07/2024 212 (H)  0 - 200 mg/dL Final    Triglycerides 11/07/2024 71  0 - 150 mg/dL Final    HDL Cholesterol 11/07/2024 52  40 - 60 mg/dL Final    LDL Cholesterol  11/07/2024 147 (H)  0 - 100 mg/dL Final    VLDL Cholesterol 11/07/2024 13  5 - 40 mg/dL Final    LDL/HDL Ratio 11/07/2024 2.80   Final    Glucose 11/07/2024 101 (H)  65 - 99 mg/dL Final    BUN 11/07/2024 19  6 - 20 mg/dL Final    Creatinine 11/07/2024 0.92  0.76 - 1.27 mg/dL Final    Sodium 11/07/2024 139  136 - 145 mmol/L Final    Potassium 11/07/2024 4.9  3.5 - 5.2 mmol/L Final    Chloride 11/07/2024 103  98 - 107 mmol/L Final    CO2 11/07/2024 26.3  22.0 - 29.0 mmol/L Final    Calcium 11/07/2024 9.6  8.6 - 10.5 mg/dL Final    Total Protein 11/07/2024 7.1  6.0 - 8.5 g/dL Final    Albumin 11/07/2024 4.8  3.5 - 5.2 g/dL Final    ALT (SGPT) 11/07/2024 23  1 - 41 U/L Final    AST (SGOT) 11/07/2024 24  1 - 40 U/L Final    Alkaline Phosphatase 11/07/2024 50  39 - 117 U/L Final    Total Bilirubin 11/07/2024 0.4  0.0 - 1.2 mg/dL Final    Globulin 11/07/2024 2.3  gm/dL Final    A/G Ratio 11/07/2024 2.1  g/dL Final    BUN/Creatinine Ratio 11/07/2024 20.7  7.0 - 25.0 Final    Anion Gap 11/07/2024 9.7  5.0 - 15.0 mmol/L Final    eGFR 11/07/2024 111.2  >60.0 mL/min/1.73 Final    WBC 11/07/2024 5.97  3.40 - 10.80 10*3/mm3 Final    RBC 11/07/2024 5.08  4.14 - 5.80 10*6/mm3 Final    Hemoglobin 11/07/2024 15.2  13.0 - 17.7 g/dL Final    Hematocrit 11/07/2024 44.6  37.5 - 51.0 % Final    MCV 11/07/2024  87.8  79.0 - 97.0 fL Final    MCH 11/07/2024 29.9  26.6 - 33.0 pg Final    MCHC 11/07/2024 34.1  31.5 - 35.7 g/dL Final    RDW 11/07/2024 12.3  12.3 - 15.4 % Final    RDW-SD 11/07/2024 39.2  37.0 - 54.0 fl Final    MPV 11/07/2024 11.1  6.0 - 12.0 fL Final    Platelets 11/07/2024 216  140 - 450 10*3/mm3 Final    TSH 11/07/2024 1.150  0.270 - 4.200 uIU/mL Final       Diagnoses and all orders for this visit:    1. Exposure to the flu (Primary)    2. Fever, unspecified fever cause    3. Acute cough    Other orders  -     oseltamivir (Tamiflu) 75 MG capsule; Take 1 capsule by mouth 2 (Two) Times a Day.  Dispense: 10 capsule; Refill: 0  -     brompheniramine-pseudoephedrine-DM 30-2-10 MG/5ML syrup; Take 5 mL by mouth 4 (Four) Times a Day As Needed for Congestion or Cough.  Dispense: 120 mL; Refill: 0      Recommend Tamiflu for 5 days, Bromfed as needed  May alternate Tylenol every 4 hours, not to exceed 3 g in 24 hours with ibuprofen 800 mg every 6 hours.   Push fluids, rest  Call or msg if s/s wonb in 5 days.     Return for Next scheduled follow up or earlier if needed.        EMR Dragon transcription disclaimer:  Part of this note may be an electronic transcription/translation of spoken language to printed text using the Dragon Dictation System.    This was an audio and video enabled telemedicine encounter.  Total time of discussion was 20 minutes

## 2024-11-27 ENCOUNTER — TELEPHONE (OUTPATIENT)
Dept: FAMILY MEDICINE CLINIC | Facility: CLINIC | Age: 34
End: 2024-11-27
Payer: COMMERCIAL

## 2024-11-27 RX ORDER — AZITHROMYCIN 250 MG/1
TABLET, FILM COATED ORAL
Qty: 6 TABLET | Refills: 0 | Status: SHIPPED | OUTPATIENT
Start: 2024-11-27

## 2024-11-27 RX ORDER — METHYLPREDNISOLONE 4 MG/1
TABLET ORAL
Qty: 21 TABLET | Refills: 0 | Status: SHIPPED | OUTPATIENT
Start: 2024-11-27

## 2024-11-27 NOTE — TELEPHONE ENCOUNTER
Pt states he has 3 days of tamiflu left, but he is having concerns with his lungs and possible pneumonia. States he has dry cough with wheeze. Please advise

## 2025-02-03 DIAGNOSIS — I10 ESSENTIAL HYPERTENSION: ICD-10-CM

## 2025-02-03 RX ORDER — BISOPROLOL FUMARATE 5 MG/1
5 TABLET, FILM COATED ORAL DAILY
Qty: 90 TABLET | Refills: 3 | Status: SHIPPED | OUTPATIENT
Start: 2025-02-03